# Patient Record
Sex: MALE | Race: WHITE | NOT HISPANIC OR LATINO | Employment: OTHER | URBAN - NONMETROPOLITAN AREA
[De-identification: names, ages, dates, MRNs, and addresses within clinical notes are randomized per-mention and may not be internally consistent; named-entity substitution may affect disease eponyms.]

---

## 2017-07-24 RX ORDER — DILTIAZEM HYDROCHLORIDE 240 MG/1
CAPSULE, EXTENDED RELEASE ORAL
Qty: 30 CAPSULE | Refills: 0 | OUTPATIENT
Start: 2017-07-24

## 2019-04-19 RX ORDER — ALBUTEROL SULFATE 90 UG/1
2 AEROSOL, METERED RESPIRATORY (INHALATION) EVERY 4 HOURS PRN
COMMUNITY
End: 2019-05-01

## 2019-04-21 PROBLEM — G47.33 OBSTRUCTIVE SLEEP APNEA: Status: ACTIVE | Noted: 2019-04-21

## 2019-04-21 PROBLEM — J43.8 OTHER EMPHYSEMA (HCC): Status: ACTIVE | Noted: 2019-04-21

## 2019-04-21 PROBLEM — J44.9 COPD, MODERATE: Status: ACTIVE | Noted: 2019-04-21

## 2019-04-21 PROBLEM — J45.40 MODERATE PERSISTENT ASTHMA WITHOUT COMPLICATION: Status: ACTIVE | Noted: 2019-04-21

## 2019-05-01 ENCOUNTER — OFFICE VISIT (OUTPATIENT)
Dept: PULMONOLOGY | Facility: CLINIC | Age: 64
End: 2019-05-01

## 2019-05-01 VITALS
HEIGHT: 66 IN | BODY MASS INDEX: 33.75 KG/M2 | SYSTOLIC BLOOD PRESSURE: 120 MMHG | DIASTOLIC BLOOD PRESSURE: 60 MMHG | OXYGEN SATURATION: 98 % | WEIGHT: 210 LBS | HEART RATE: 80 BPM

## 2019-05-01 DIAGNOSIS — G47.33 OBSTRUCTIVE SLEEP APNEA: ICD-10-CM

## 2019-05-01 DIAGNOSIS — J43.8 OTHER EMPHYSEMA (HCC): ICD-10-CM

## 2019-05-01 DIAGNOSIS — J45.40 MODERATE PERSISTENT ASTHMA WITHOUT COMPLICATION: ICD-10-CM

## 2019-05-01 DIAGNOSIS — J44.9 COPD, MODERATE (HCC): Primary | ICD-10-CM

## 2019-05-01 DIAGNOSIS — M41.80 DEXTROSCOLIOSIS: ICD-10-CM

## 2019-05-01 PROCEDURE — 99214 OFFICE O/P EST MOD 30 MIN: CPT | Performed by: INTERNAL MEDICINE

## 2019-05-01 RX ORDER — BUDESONIDE AND FORMOTEROL FUMARATE DIHYDRATE 160; 4.5 UG/1; UG/1
2 AEROSOL RESPIRATORY (INHALATION) 2 TIMES DAILY
Qty: 1 INHALER | Refills: 11 | Status: SHIPPED | OUTPATIENT
Start: 2019-05-01 | End: 2019-05-31

## 2019-05-01 RX ORDER — RANITIDINE HCL 75 MG
75 TABLET ORAL 2 TIMES DAILY
COMMUNITY
End: 2021-06-29 | Stop reason: ALTCHOICE

## 2019-05-01 RX ORDER — DILTIAZEM HYDROCHLORIDE 240 MG/1
CAPSULE, COATED, EXTENDED RELEASE ORAL
COMMUNITY
Start: 2019-04-01

## 2019-05-01 RX ORDER — DILTIAZEM HYDROCHLORIDE 240 MG/1
CAPSULE, COATED, EXTENDED RELEASE ORAL
COMMUNITY
End: 2019-05-01

## 2019-05-01 RX ORDER — DIPHENHYDRAMINE HCL 25 MG
50 CAPSULE ORAL 2 TIMES DAILY
COMMUNITY

## 2019-05-01 RX ORDER — PRAVASTATIN SODIUM 40 MG
40 TABLET ORAL DAILY
COMMUNITY
Start: 2019-04-01

## 2019-05-01 RX ORDER — ALBUTEROL SULFATE 90 UG/1
2 AEROSOL, METERED RESPIRATORY (INHALATION) EVERY 4 HOURS PRN
Qty: 1 INHALER | Refills: 11 | Status: SHIPPED | OUTPATIENT
Start: 2019-05-01 | End: 2019-05-31

## 2019-05-01 RX ORDER — ACETAMINOPHEN 500 MG
500 TABLET ORAL
COMMUNITY

## 2019-05-01 RX ORDER — NAPROXEN 500 MG/1
500 TABLET ORAL 2 TIMES DAILY WITH MEALS
COMMUNITY
Start: 2019-04-01

## 2019-05-01 NOTE — PROGRESS NOTES
Subjective   Feliberto Eli is a 64 y.o. male.     Background: Pt with dyspnea, FEV1 56% pred 2018, gold B copd, asthma overlap, emphysema, olivier    Chief Complaint   Patient presents with   • Shortness of Breath        History of Present Illness   He tried the inhalers which didn't help, but then a couple of months went past.  He had cough due to the dry powder.  He has tried symbicort, bevespi, trelegy, dulera, asmanex.  The only one helpful is Symbicort.  Emergency inhaler has been helpful and he has not had a full blown asthma attack in over 2 years.  He has severe scoliosis and has been short of breath his entire life related to that.  Dyspnea is triggerd by hairspray, cleaning solutions, and pollen and perfumes.    Medical/Family/Social History   has a past medical history of Arthritis, Cancer (CMS/HCC), COPD (chronic obstructive pulmonary disease) (CMS/HCC), Dextroscoliosis (5/1/2019), Diverticulosis, Elevated cholesterol, Hemorrhoids, and Hypertension.   has a past surgical history that includes Colonoscopy; Prostate surgery; and Back surgery.  family history includes Cancer in his other; Diabetes in his father; Heart disease in his father, mother, and other; Hyperlipidemia in his father, mother, and other; Hypertension in his father, mother, and other.   reports that he has never smoked. He has never used smokeless tobacco. Alcohol use questions deferred to the physician. Drug use questions deferred to the physician.  No Known Allergies  Medications    Current Outpatient Medications:   •  acetaminophen (TYLENOL) 100 MG/ML solution, Take 1,000 mg/kg by mouth Every 4 (Four) Hours As Needed for Fever., Disp: , Rfl:   •  diltiaZEM CD (CARDIZEM CD) 240 MG 24 hr capsule, , Disp: , Rfl:   •  diphenhydrAMINE (BENADRYL ALLERGY) 25 mg capsule, Benadryl Allergy, Disp: , Rfl:   •  naproxen (NAPROSYN) 500 MG tablet, , Disp: , Rfl:   •  pravastatin (PRAVACHOL) 40 MG tablet, , Disp: , Rfl:   •  raNITIdine (ZANTAC) 75 MG  "tablet, Take 75 mg by mouth 2 (Two) Times a Day., Disp: , Rfl:   •  albuterol sulfate  (90 Base) MCG/ACT inhaler, Inhale 2 puffs Every 4 (Four) Hours As Needed for Wheezing or Shortness of Air for up to 30 days. Dispense formulary preferred brand, Disp: 1 inhaler, Rfl: 11  •  budesonide-formoterol (SYMBICORT) 160-4.5 MCG/ACT inhaler, Inhale 2 puffs 2 (Two) Times a Day for 30 days., Disp: 1 inhaler, Rfl: 11    Review of Systems   Constitutional: Negative for chills and fever.   Cardiovascular: Negative for chest pain.   Gastrointestinal: Negative for nausea and vomiting.     Objective   /60   Pulse 80   Ht 167.6 cm (66\")   Wt 95.3 kg (210 lb)   SpO2 98% Comment: RA  BMI 33.89 kg/m²   Physical Exam   Constitutional: He appears well-developed and well-nourished. He does not appear ill. No distress.   HENT:   Head: Normocephalic and atraumatic.   Nose: Nose normal.   Eyes: Conjunctivae and EOM are normal.   Neck: Neck supple.   Cardiovascular: Normal rate, regular rhythm, S1 normal and S2 normal.   Pulmonary/Chest: Effort normal. He has no wheezes. He has no rales.   Abdominal: Soft. He exhibits no distension. There is no tenderness. There is no guarding.   Musculoskeletal: He exhibits deformity (severe scoliosis).   Neurological: He is alert.   Skin: Skin is warm and dry. No rash noted.   Psychiatric: He has a normal mood and affect.     Assessment/Plan   Problem List Items Addressed This Visit        Respiratory    Moderate persistent asthma without complication    Relevant Medications    albuterol sulfate  (90 Base) MCG/ACT inhaler    budesonide-formoterol (SYMBICORT) 160-4.5 MCG/ACT inhaler    Other emphysema (CMS/HCC)    Relevant Medications    diphenhydrAMINE (BENADRYL ALLERGY) 25 mg capsule    albuterol sulfate  (90 Base) MCG/ACT inhaler    budesonide-formoterol (SYMBICORT) 160-4.5 MCG/ACT inhaler    Obstructive sleep apnea    COPD, moderate (CMS/HCC) - Primary    Relevant " Medications    diphenhydrAMINE (BENADRYL ALLERGY) 25 mg capsule    albuterol sulfate  (90 Base) MCG/ACT inhaler    budesonide-formoterol (SYMBICORT) 160-4.5 MCG/ACT inhaler       Musculoskeletal and Integument    Dextroscoliosis        Patient's Body mass index is 33.89 kg/m². BMI is above normal parameters. Recommendations include: referral to primary care.      After discussion, we will change inhaler therapy to symbicort and albuterol rescue.  Hopefully continuous exposure to the ics will help keep him under better control.  These but none of the others have been helpful.  Discussed the coincidence of emphysema and asthma. Try to avoid triggers.  Annual flu vaccine.  We discussed pulmonary rehab.  He would like to do that once other circumstances in his life are settled and he gets time to come in to do it.  We discussed the mechanical problems related to scoliosis are significant triggers.       Electronically signed by Aman Joseph MD, 5/1/2019, 11:35 AM

## 2019-11-04 ENCOUNTER — OFFICE VISIT (OUTPATIENT)
Dept: PULMONOLOGY | Facility: CLINIC | Age: 64
End: 2019-11-04

## 2019-11-04 VITALS
SYSTOLIC BLOOD PRESSURE: 132 MMHG | HEART RATE: 76 BPM | BODY MASS INDEX: 32.14 KG/M2 | WEIGHT: 200 LBS | OXYGEN SATURATION: 99 % | DIASTOLIC BLOOD PRESSURE: 80 MMHG | HEIGHT: 66 IN

## 2019-11-04 DIAGNOSIS — M41.80 DEXTROSCOLIOSIS: ICD-10-CM

## 2019-11-04 DIAGNOSIS — J45.40 MODERATE PERSISTENT ASTHMA WITHOUT COMPLICATION: Primary | ICD-10-CM

## 2019-11-04 DIAGNOSIS — J44.9 COPD, MODERATE (HCC): ICD-10-CM

## 2019-11-04 DIAGNOSIS — Z23 NEED FOR IMMUNIZATION AGAINST INFLUENZA: ICD-10-CM

## 2019-11-04 DIAGNOSIS — G47.33 OBSTRUCTIVE SLEEP APNEA: ICD-10-CM

## 2019-11-04 PROCEDURE — 90674 CCIIV4 VAC NO PRSV 0.5 ML IM: CPT | Performed by: INTERNAL MEDICINE

## 2019-11-04 PROCEDURE — 90471 IMMUNIZATION ADMIN: CPT | Performed by: INTERNAL MEDICINE

## 2019-11-04 PROCEDURE — 94010 BREATHING CAPACITY TEST: CPT | Performed by: INTERNAL MEDICINE

## 2019-11-04 PROCEDURE — 99213 OFFICE O/P EST LOW 20 MIN: CPT | Performed by: INTERNAL MEDICINE

## 2019-11-04 NOTE — PROGRESS NOTES
Subjective   Feliberto Eli is a 64 y.o. male.     Background: Pt with dyspnea, FEV1 56% pred 2018, gold B copd, asthma overlap, emphysema, olivier    Chief Complaint   Patient presents with   • COPD        History of Present Illness   With a smile on his face he says he is up and about.  He says the spiriva was not helpful at all.  He uses the albuterol sometimes.  He developed muscle pain leading to dx of [place] spotted fever, treated with antibiotics, and he was found to have hgb a1c high and he has cut out refined sugars.  He still loses his breath and recovers quicker than he did recently.  He has lost about 10 pounds.  He went hunting Saturday, was out of breath walking up the hill but was back to normal by the time he got back to the truck.    Medical/Family/Social History   has a past medical history of Arthritis, Cancer (CMS/[facility]), COPD (chronic obstructive pulmonary disease) (CMS/[facility]), Dextroscoliosis (5/1/2019), Diverticulosis, Elevated cholesterol, Hemorrhoids, and Hypertension.   has a past surgical history that includes Colonoscopy; Prostate surgery; and Back surgery.  family history includes Cancer in an other family member; Diabetes in his father; Heart disease in his father, mother, and another family member; Hyperlipidemia in his father, mother, and another family member; Hypertension in his father, mother, and another family member.   reports that he has never smoked. He has never used smokeless tobacco. Alcohol use questions deferred to the physician. Drug use questions deferred to the physician.  No Known Allergies  Medications    Current Outpatient Medications:   •  acetaminophen (TYLENOL) 100 MG/ML solution, Take 1,000 mg/kg by mouth Every 4 (Four) Hours As Needed for Fever., Disp: , Rfl:   •  diltiaZEM CD (CARDIZEM CD) 240 MG 24 hr capsule, , Disp: , Rfl:   •  diphenhydrAMINE (BENADRYL ALLERGY) 25 mg capsule, Benadryl Allergy, Disp: , Rfl:   •  naproxen (NAPROSYN) 500 MG tablet, , Disp: , Rfl:  "  •  pravastatin (PRAVACHOL) 40 MG tablet, , Disp: , Rfl:   •  raNITIdine (ZANTAC) 75 MG tablet, Take 75 mg by mouth 2 (Two) Times a Day., Disp: , Rfl:     Review of Systems   Constitutional: Negative for chills and fever.   Respiratory: Negative for choking.    Cardiovascular: Negative for chest pain.   Gastrointestinal: Negative for vomiting.     Objective   /80   Pulse 76   Ht 167.6 cm (66\")   Wt 90.7 kg (200 lb)   SpO2 99% Comment: RA  BMI 32.28 kg/m²   Physical Exam   Constitutional: He appears well-developed and well-nourished. He does not appear ill. No distress.   HENT:   Head: Normocephalic and atraumatic.   Nose: Nose normal.   Eyes: Conjunctivae and EOM are normal.   Neck: Neck supple.   Cardiovascular: Normal rate, regular rhythm, S1 normal and S2 normal.   Pulmonary/Chest: Effort normal. He has no wheezes. He has no rales.   Abdominal: Soft. He exhibits no distension. There is no tenderness. There is no guarding.   Musculoskeletal: He exhibits no deformity.   Gait normal   Neurological: He is alert.   Skin: Skin is warm and dry. No rash noted.   Psychiatric: He has a normal mood and affect.        -----------------------------------------------------------------------------------------------       -----------------------------------------------------------------------------------------------    Pulmonary Function Test  Performed by: Aman Joseph MD  Authorized by: Aman Joseph MD      Pre Drug    FVC: 58%   FEV1: 49%   FEF 25-75%: 26%   FEV1/FVC: 67.44%    My interpretation of PFT: severe airflow obstruction, slight decline compared with prior study    -----------------------------------------------------------------------------------------------  Assessment/Plan   Problem List Items Addressed This Visit        Respiratory    Moderate persistent asthma without complication - Primary    Relevant Orders    Pulmonary Function Test (Completed)    Obstructive sleep apnea    COPD, " moderate (CMS/HCC)       Musculoskeletal and Integument    Dextroscoliosis      Other Visit Diagnoses     Need for immunization against influenza        Relevant Orders    Flucelvax Quad=>4Years (PFS) (Completed)        Patient's Body mass index is 32.28 kg/m². BMI is above normal parameters. Recommendations include: referral to primary care.      He appears to be as compensated as possible for now.  Continue inhalers as is.  PFT a little lower likely related to recent congestion.  Will continue to observe, especially with no improvement with other inhalers.       Electronically signed by Aman Joseph MD, 11/4/2019, 10:42 AM

## 2019-11-04 NOTE — PROCEDURES
Pulmonary Function Test  Performed by: Aman Joseph MD  Authorized by: Aman Joseph MD      Pre Drug    FVC: 58%   FEV1: 49%   FEF 25-75%: 26%   FEV1/FVC: 67.44%

## 2020-06-16 NOTE — PROGRESS NOTES
" GILSON Hernández  [facility]   [facility]  [address] [place] [address]  Phone: [phone]  Fax: [phone]     Feliberto Eli is a 65 y.o. male.   CC:   Chief Complaint   Patient presents with   • Asthma        HPI: Mr. Eli is being evaluated today for management of of his asthma/COPD overlap.  He experiences mild intermittent shortness of breath which is worsened by exertion with incline and improved with rest.  He has tried Spiriva which was not helpful.  He has an albuterol rescue although he seldom utilizes it.  He reports he uses it approximately once every 8 to 10 weeks.  It does work well when he does have to utilize it.  His triggers are smoke, perfume and \"oil air fresheners\". His condition is complicated by obesity, obstructive sleep apnea and scoliosis.  He has been prescribed a CPAP for his sleep apnea.  He is compliant with this and does benefit from it.  His only other health concern is a recent diagnosis of [place] spotted fever.  This was identified after a tick bite that would not heal.  He was accompanied by fatigue and myalgias.  They did not give him any medications.  They did also identify his A1c to be 6.5.  They are monitoring this closely.    The following portions of the patient's history were reviewed and updated as appropriate: allergies, current medications, past family history, past medical history, past social history, past surgical history and problem list.    Past Medical History:   Diagnosis Date   • Arthritis    • Cancer (CMS/[facility])    • COPD (chronic obstructive pulmonary disease) (CMS/[facility])    • Dextroscoliosis 5/1/2019   • Diverticulosis    • Elevated cholesterol    • Hemorrhoids    • Hypertension          Prior to Admission medications    Medication Sig Start Date End Date Taking? Authorizing Provider   acetaminophen (TYLENOL) 100 MG/ML solution Take 1,000 mg/kg by mouth Every 4 (Four) Hours As Needed for Fever.    Provider, " MD Lamonte   diltiaZEM CD (CARDIZEM CD) 240 MG 24 hr capsule  4/1/19   Lamonte Fox MD   diphenhydrAMINE (BENADRYL ALLERGY) 25 mg capsule Benadryl Allergy    Lamonte Fox MD   naproxen (NAPROSYN) 500 MG tablet  4/1/19   Lamonte Fox MD   pravastatin (PRAVACHOL) 40 MG tablet  4/1/19   Lamonte Fox MD   raNITIdine (ZANTAC) 75 MG tablet Take 75 mg by mouth 2 (Two) Times a Day.    Lamonte Fox MD       Family History   Problem Relation Age of Onset   • Heart disease Mother    • Hypertension Mother    • Hyperlipidemia Mother    • Heart disease Father    • Hypertension Father    • Hyperlipidemia Father    • Diabetes Father    • Hyperlipidemia Other    • Cancer Other    • Heart disease Other    • Hypertension Other        Social History     Socioeconomic History   • Marital status:      Spouse name: Not on file   • Number of children: Not on file   • Years of education: Not on file   • Highest education level: Not on file   Tobacco Use   • Smoking status: Never Smoker   • Smokeless tobacco: Never Used   Substance and Sexual Activity   • Alcohol use: Defer   • Drug use: Defer   • Sexual activity: Defer       Review of Systems   Constitutional: Positive for fatigue. Negative for activity change, chills and fever.   HENT: Negative for congestion, postnasal drip, rhinorrhea, sinus pressure, sore throat and trouble swallowing.    Eyes: Negative for blurred vision, double vision and pain.   Respiratory: Positive for cough and shortness of breath. Negative for chest tightness and wheezing.    Cardiovascular: Negative for chest pain, palpitations and leg swelling.   Gastrointestinal: Negative for abdominal distention, constipation, diarrhea, nausea and vomiting.   Endocrine: Negative for polydipsia, polyphagia and polyuria.   Genitourinary: Negative for dysuria, frequency and urgency.   Musculoskeletal: Positive for myalgias. Negative for arthralgias, back pain, gait problem  and joint swelling.   Skin: Positive for rash. Negative for color change, dry skin and skin lesions.   Allergic/Immunologic: Negative for environmental allergies, food allergies and immunocompromised state.   Neurological: Negative for dizziness, seizures, speech difficulty, weakness, light-headedness, memory problem and confusion.   Hematological: Negative for adenopathy. Does not bruise/bleed easily.   Psychiatric/Behavioral: Positive for sleep disturbance. Negative for negative for hyperactivity and depressed mood. The patient is not nervous/anxious.        /70   Pulse 91   Temp 98.1 °F (36.7 °C)   Wt 85.5 kg (188 lb 9.6 oz)   SpO2 98% Comment: RA  BMI 30.44 kg/m²     Physical Exam   Constitutional: He is oriented to person, place, and time. He appears well-developed and well-nourished. No distress. He is obese.  HENT:   Head: Normocephalic and atraumatic.   Right Ear: External ear normal.   Left Ear: External ear normal.   Nose: Nose normal.   Mouth/Throat: Oropharynx is clear and moist. No oropharyngeal exudate.   MASK ON, GLASSES   Eyes: Pupils are equal, round, and reactive to light. Conjunctivae and EOM are normal. Right eye exhibits no discharge. Left eye exhibits no discharge.   Neck: Normal range of motion. Neck supple. No JVD present.   Cardiovascular: Normal rate and regular rhythm.   No murmur heard.  Pulmonary/Chest: Effort normal and breath sounds normal. No respiratory distress. He has no wheezes.   Abdominal: Soft. Bowel sounds are normal. He exhibits no distension. There is no tenderness.   Musculoskeletal: Normal range of motion. He exhibits no edema or deformity.   SEVERE SCOLIOSIS   Neurological: He is alert and oriented to person, place, and time. He displays normal reflexes. No cranial nerve deficit. Coordination normal.   Skin: Skin is warm and dry. No rash noted. He is not diaphoretic. No erythema.   Psychiatric: He has a normal mood and affect. His behavior is normal. Thought  content normal.   Nursing note and vitals reviewed.      Pulmonary Functions Testing Results:    PFT Values        Some values may be hidden. Unless noted otherwise, only the newest values recorded on each date are displayed.         Old Values PFT Results 11/4/19   No data to display.      Pre Drug PFT Results 11/4/19   FVC 58   FEV1 49   FEF 25-75% 26   FEV1/FVC 67.44      Post Drug PFT Results 11/4/19   No data to display.      Other Tests PFT Results 11/4/19   No data to display.           Results for orders placed in visit on 11/04/19   Pulmonary Function Test    Narrative Naomy Gleason, RRT     11/4/2019 10:36 AM  Pulmonary Function Test  Performed by: Aman Joseph MD  Authorized by: Aman Joseph MD      Pre Drug    FVC: 58%   FEV1: 49%   FEF 25-75%: 26%   FEV1/FVC: 67.44%         No PFTs for this visit    CXR: No imaging for this visit        Problem List Items Addressed This Visit        Respiratory    Moderate persistent asthma without complication - Primary    Other emphysema (CMS/HCC)    COPD, moderate (CMS/HCC)       Musculoskeletal and Integument    Dextroscoliosis        Patient's Body mass index is 30.44 kg/m². BMI is above normal parameters. Recommendations include: educational material.      Assessment/Plan         Moderate asthma and COPD, unchanged since his last visit with Dr. Joseph.  He has not benefited from long-acting inhalers but responds well to albuterol as needed.  He is using it very seldom.  Suspect some of his breathing is related to his severe scoliosis.  He is utilizing an incentive spirometer multiple times a day and able to pull 2200.  I encouraged him to continue to do this to help condition his lungs.  Unable to do PFTs today due to COVID.  We will update those when he returns in 6 months to see Dr. Joseph.  He is compliant with his CPAP and benefiting from that therefore I encouraged him to continue it.  He will call sooner if needed.    Gabriella Thomas,  APRN  6/25/2020  15:10    Return in about 6 months (around 12/25/2020) for FVL, MAKE APPT WITH DR XAVIER.

## 2020-06-25 ENCOUNTER — OFFICE VISIT (OUTPATIENT)
Dept: PULMONOLOGY | Facility: CLINIC | Age: 65
End: 2020-06-25

## 2020-06-25 VITALS
WEIGHT: 188.6 LBS | OXYGEN SATURATION: 98 % | TEMPERATURE: 98.1 F | DIASTOLIC BLOOD PRESSURE: 70 MMHG | BODY MASS INDEX: 30.44 KG/M2 | SYSTOLIC BLOOD PRESSURE: 126 MMHG | HEART RATE: 91 BPM

## 2020-06-25 DIAGNOSIS — M41.80 DEXTROSCOLIOSIS: ICD-10-CM

## 2020-06-25 DIAGNOSIS — J45.40 MODERATE PERSISTENT ASTHMA WITHOUT COMPLICATION: Primary | ICD-10-CM

## 2020-06-25 DIAGNOSIS — J43.8 OTHER EMPHYSEMA (HCC): ICD-10-CM

## 2020-06-25 DIAGNOSIS — J44.9 COPD, MODERATE (HCC): ICD-10-CM

## 2020-06-25 PROCEDURE — 99214 OFFICE O/P EST MOD 30 MIN: CPT | Performed by: NURSE PRACTITIONER

## 2020-06-25 RX ORDER — HYDROCORTISONE 25 MG/G
CREAM TOPICAL
COMMUNITY
Start: 2020-06-10

## 2020-12-29 ENCOUNTER — OFFICE VISIT (OUTPATIENT)
Dept: PULMONOLOGY | Facility: CLINIC | Age: 65
End: 2020-12-29

## 2020-12-29 VITALS
TEMPERATURE: 97.1 F | OXYGEN SATURATION: 98 % | BODY MASS INDEX: 32.17 KG/M2 | HEIGHT: 64 IN | WEIGHT: 188.4 LBS | SYSTOLIC BLOOD PRESSURE: 150 MMHG | HEART RATE: 78 BPM | DIASTOLIC BLOOD PRESSURE: 80 MMHG

## 2020-12-29 DIAGNOSIS — M41.80 DEXTROSCOLIOSIS: ICD-10-CM

## 2020-12-29 DIAGNOSIS — G47.33 OBSTRUCTIVE SLEEP APNEA: ICD-10-CM

## 2020-12-29 DIAGNOSIS — J44.9 COPD, MODERATE (HCC): ICD-10-CM

## 2020-12-29 DIAGNOSIS — J45.40 MODERATE PERSISTENT ASTHMA WITHOUT COMPLICATION: Primary | ICD-10-CM

## 2020-12-29 DIAGNOSIS — J43.8 OTHER EMPHYSEMA (HCC): ICD-10-CM

## 2020-12-29 PROCEDURE — 99213 OFFICE O/P EST LOW 20 MIN: CPT | Performed by: INTERNAL MEDICINE

## 2020-12-29 NOTE — PROGRESS NOTES
Subjective   Feliberto Eli is a 65 y.o. male.     Background: Pt with dyspnea, FEV1 56% pred 2018, gold B copd, asthma overlap, emphysema, olivier    Chief Complaint   Patient presents with   • Asthma   • COPD        History of Present Illness   He had to quarantine for 4 weeks after his daughter who has pectus excavatum had covid.  He had a negative covid test.  He was congested for about 8 days. He has not had major exacerbations.  He got a new cpap machine in august after being on the old one for 5 years.  He has an incentive spirometer.  He works on a farm.    Medical/Family/Social History   has a past medical history of Arthritis, Cancer (CMS/HCC), COPD (chronic obstructive pulmonary disease) (CMS/HCC), Dextroscoliosis (5/1/2019), Diverticulosis, Elevated cholesterol, Hemorrhoids, and Hypertension.   has a past surgical history that includes Colonoscopy; Prostate surgery; and Back surgery.  family history includes Cancer in an other family member; Diabetes in his father; Heart disease in his father, mother, and another family member; Hyperlipidemia in his father, mother, and another family member; Hypertension in his father, mother, and another family member.   reports that he has never smoked. He has never used smokeless tobacco. Alcohol use questions deferred to the physician. Drug use questions deferred to the physician.  No Known Allergies  Medications    Current Outpatient Medications:   •  acetaminophen (TYLENOL) 100 MG/ML solution, Take 1,000 mg/kg by mouth Every 4 (Four) Hours As Needed for Fever., Disp: , Rfl:   •  diltiaZEM CD (CARDIZEM CD) 240 MG 24 hr capsule, , Disp: , Rfl:   •  diphenhydrAMINE (BENADRYL ALLERGY) 25 mg capsule, Take 50 mg by mouth 2 (two) times a day., Disp: , Rfl:   •  Hydrocortisone, Perianal, (ANUSOL-HC) 2.5 % rectal cream, , Disp: , Rfl:   •  naproxen (NAPROSYN) 500 MG tablet, Take 500 mg by mouth 2 (Two) Times a Day With Meals., Disp: , Rfl:   •  pravastatin (PRAVACHOL) 40 MG  "tablet, Take 40 mg by mouth Daily., Disp: , Rfl:   •  raNITIdine (ZANTAC) 75 MG tablet, Take 75 mg by mouth 2 (Two) Times a Day., Disp: , Rfl:     Review of Systems   Constitutional: Negative for chills and fever.   Cardiovascular: Negative for chest pain.   Gastrointestinal: Negative for nausea and vomiting.     Objective   /80   Pulse 78   Temp 97.1 °F (36.2 °C)   Ht 162.6 cm (64\")   Wt 85.5 kg (188 lb 6.4 oz)   SpO2 98% Comment: ra  BMI 32.34 kg/m²   Physical Exam  Constitutional:       General: He is not in acute distress.     Appearance: He is well-developed. He is not ill-appearing.   HENT:      Head: Normocephalic and atraumatic.      Nose: Nose normal.   Eyes:      Conjunctiva/sclera: Conjunctivae normal.   Neck:      Musculoskeletal: Neck supple.   Cardiovascular:      Rate and Rhythm: Normal rate and regular rhythm.      Heart sounds: S1 normal and S2 normal.   Pulmonary:      Effort: Pulmonary effort is normal. No respiratory distress.      Breath sounds: No wheezing, rhonchi or rales.   Abdominal:      General: There is no distension.      Palpations: Abdomen is soft.      Tenderness: There is no abdominal tenderness. There is no guarding.   Musculoskeletal:         General: No deformity.   Lymphadenopathy:      Cervical: No cervical adenopathy.   Skin:     General: Skin is warm and dry.      Findings: No rash.   Neurological:      Mental Status: He is alert.       PFT Values        Some values may be hidden. Unless noted otherwise, only the newest values recorded on each date are displayed.         Old Values PFT Results 11/4/19   No data to display.      Pre Drug PFT Results 11/4/19   FVC 58   FEV1 49   FEF 25-75% 26   FEV1/FVC 67.44      Post Drug PFT Results 11/4/19   No data to display.      Other Tests PFT Results 11/4/19   No data to display.                -----------------------------------------------------------------------------------------------  Assessment/Plan   Problem List " Items Addressed This Visit        Pulmonary Problems    Moderate persistent asthma without complication - Primary    Other emphysema (CMS/HCC)    Obstructive sleep apnea    COPD, moderate (CMS/HCC)       Other    Dextroscoliosis        Patient's Body mass index is 32.34 kg/m². BMI is above normal parameters. Recommendations include: referral to primary care.      He seems stable from obstructive lung disease standpoint  He is not using inhalers except albuterol prn.  Others have not been helpful  He can continue using incentive spirometry          Electronically signed by Aman Joseph MD, 12/29/2020, 15:45 CST

## 2021-06-25 NOTE — PROGRESS NOTES
"Background:  Pt with dyspnea, FEV1 56% pred 2018, gold B copd, asthma overlap, emphysema, olivier   Chief Complaint  Moderate persistent asthma without complication    Subjective    History of Present Illness       Feliberto Eli presents to [facility].  Heat and humidity really bother him.  Other inhalers were not helpful in the past.  His dad was a heavy smoker and pt had second hand smoke.  No exacerbations.  He uses the inhaler about once every 3 months and he is careful with what he is around.  Smoke and perfumes are bad.  He has had severe scoliosis problems     Objective     Vital Signs:   /82   Pulse 83   Ht 162.6 cm (64\")   Wt 87.5 kg (193 lb)   SpO2 96% Comment: RA  BMI 33.13 kg/m²   Physical Exam  Constitutional:       Appearance: Normal appearance. He is not ill-appearing or diaphoretic.   Eyes:      Extraocular Movements: Extraocular movements intact.   Pulmonary:      Effort: Pulmonary effort is normal. No respiratory distress.      Breath sounds: No wheezing, rhonchi or rales.   Musculoskeletal:      Comments: scoliosis   Skin:     Findings: No erythema or rash.   Neurological:      Mental Status: He is alert.        Result Review  Data Reviewed:{ Labs  Result Review  Imaging  Media :23}       PFT Values        Some values may be hidden. Unless noted otherwise, only the newest values recorded on each date are displayed.         Old Values PFT Results 11/4/19   No data to display.      Pre Drug PFT Results 11/4/19   FVC 58   FEV1 49   FEF 25-75% 26   FEV1/FVC 67.44      Post Drug PFT Results 11/4/19   No data to display.      Other Tests PFT Results 11/4/19   No data to display.                      Assessment and Plan  {CC Problem List  Visit Diagnosis  ROS  Review (Popup)  [facility] Maintenance  Quality  BestPractice  Medications  SmartSets  SnapShot Encounters  Media :23}   Problem List Items Addressed This Visit        Pulmonary " Problems    Moderate persistent asthma without complication - Primary    Relevant Orders    XR Chest 2 View    Obstructive sleep apnea    COPD, moderate (CMS/HCC)    Relevant Orders    XR Chest 2 View      he is overall stable with respect to asthma/copd  Continue using his incentive spirometer and albuterol prn  If he has progression I am concerned we would need to add inhaled steroid despite absence of perceived benefit in past.  Will plan follow up spirometry and discuss    Follow Up {Instructions Charge Capture  Follow-up Communications :23}   Return in about 6 months (around 12/29/2021).  Patient was given instructions and counseling regarding his condition or for health maintenance advice. Please see specific information pulled into the AVS if appropriate.    Electronically signed by Aman Joseph MD, 6/29/2021, 15:04 CDT

## 2021-06-29 ENCOUNTER — OFFICE VISIT (OUTPATIENT)
Dept: PULMONOLOGY | Facility: CLINIC | Age: 66
End: 2021-06-29

## 2021-06-29 VITALS
DIASTOLIC BLOOD PRESSURE: 82 MMHG | BODY MASS INDEX: 32.95 KG/M2 | WEIGHT: 193 LBS | HEIGHT: 64 IN | HEART RATE: 83 BPM | SYSTOLIC BLOOD PRESSURE: 156 MMHG | OXYGEN SATURATION: 96 %

## 2021-06-29 DIAGNOSIS — J44.9 COPD, MODERATE (HCC): ICD-10-CM

## 2021-06-29 DIAGNOSIS — G47.33 OBSTRUCTIVE SLEEP APNEA: ICD-10-CM

## 2021-06-29 DIAGNOSIS — J45.40 MODERATE PERSISTENT ASTHMA WITHOUT COMPLICATION: Primary | ICD-10-CM

## 2021-06-29 PROCEDURE — 99213 OFFICE O/P EST LOW 20 MIN: CPT | Performed by: INTERNAL MEDICINE

## 2021-06-29 RX ORDER — LISINOPRIL 5 MG/1
TABLET ORAL
COMMUNITY
Start: 2021-05-27

## 2021-07-14 ENCOUNTER — TRANSCRIBE ORDERS (OUTPATIENT)
Dept: LAB | Facility: HOSPITAL | Age: 66
End: 2021-07-14

## 2021-07-14 DIAGNOSIS — Z01.818 PREOPERATIVE TESTING: Primary | ICD-10-CM

## 2021-07-19 ENCOUNTER — HOSPITAL ENCOUNTER (OUTPATIENT)
Dept: GENERAL RADIOLOGY | Facility: HOSPITAL | Age: 66
Discharge: HOME | End: 2021-07-19

## 2021-07-19 ENCOUNTER — LAB (OUTPATIENT)
Dept: LAB | Facility: HOSPITAL | Age: 66
End: 2021-07-19

## 2021-07-19 DIAGNOSIS — J45.40 MODERATE PERSISTENT ASTHMA WITHOUT COMPLICATION: ICD-10-CM

## 2021-07-19 DIAGNOSIS — Z01.818 PREOPERATIVE TESTING: ICD-10-CM

## 2021-07-19 DIAGNOSIS — J44.9 COPD, MODERATE (HCC): ICD-10-CM

## 2021-07-19 LAB — SARS-COV-2 ORF1AB RESP QL NAA+PROBE: NOT DETECTED

## 2021-07-19 PROCEDURE — 71046 X-RAY EXAM CHEST 2 VIEWS: CPT

## 2021-07-19 PROCEDURE — U0004 COV-19 TEST NON-CDC HGH THRU: HCPCS | Performed by: INTERNAL MEDICINE

## 2021-07-19 PROCEDURE — C9803 HOPD COVID-19 SPEC COLLECT: HCPCS | Performed by: INTERNAL MEDICINE

## 2021-07-19 NOTE — PROGRESS NOTES
Let pt know this looked ok. No pneumonia or other acute problems.  Nothing else to do for now.  Keep follow up as planned

## 2021-07-21 ENCOUNTER — OFFICE VISIT (OUTPATIENT)
Dept: PULMONOLOGY | Facility: CLINIC | Age: 66
End: 2021-07-21

## 2021-07-21 DIAGNOSIS — J44.9 COPD, MODERATE (HCC): Primary | ICD-10-CM

## 2021-07-21 PROCEDURE — 94010 BREATHING CAPACITY TEST: CPT | Performed by: INTERNAL MEDICINE

## 2021-07-21 PROCEDURE — 94729 DIFFUSING CAPACITY: CPT | Performed by: INTERNAL MEDICINE

## 2021-07-21 PROCEDURE — 94727 GAS DIL/WSHOT DETER LNG VOL: CPT | Performed by: INTERNAL MEDICINE

## 2021-07-21 NOTE — PROCEDURES
Pulmonary Function Test  Performed by: Naomy Gleason, RRT  Authorized by: Aman Joseph MD      Pre Drug % Predicted    FVC: 83%   FEV1: 67%   FEF 25-75%: 29%   FEV1/FVC: 64.17%   T%   RV: 94%   DLCO: 74%   D/VAsb: 102%    Interpretation   Spirometry   Spirometry shows moderate obstruction. There is reduced midflow suggesting small airway/airflow obstruction.   Review of FVL curve   Patient's effort is normal.   Lung Volume Measurements  Measurements show normal results.   Diffusion Capacity  The patient's diffusion capacity is normal.  Diffusion capacity is normal when corrected for alveolar volume.

## 2021-12-29 ENCOUNTER — OFFICE VISIT (OUTPATIENT)
Dept: PULMONOLOGY | Facility: CLINIC | Age: 66
End: 2021-12-29

## 2021-12-29 VITALS
SYSTOLIC BLOOD PRESSURE: 142 MMHG | BODY MASS INDEX: 32.95 KG/M2 | WEIGHT: 193 LBS | HEART RATE: 86 BPM | OXYGEN SATURATION: 95 % | DIASTOLIC BLOOD PRESSURE: 90 MMHG | HEIGHT: 64 IN

## 2021-12-29 DIAGNOSIS — J44.9 COPD, MODERATE (HCC): Primary | ICD-10-CM

## 2021-12-29 DIAGNOSIS — J43.8 OTHER EMPHYSEMA (HCC): ICD-10-CM

## 2021-12-29 DIAGNOSIS — Z23 NEED FOR INFLUENZA VACCINATION: ICD-10-CM

## 2021-12-29 DIAGNOSIS — G47.33 OBSTRUCTIVE SLEEP APNEA: ICD-10-CM

## 2021-12-29 PROCEDURE — 90662 IIV NO PRSV INCREASED AG IM: CPT | Performed by: INTERNAL MEDICINE

## 2021-12-29 PROCEDURE — 90471 IMMUNIZATION ADMIN: CPT | Performed by: INTERNAL MEDICINE

## 2021-12-29 PROCEDURE — 99213 OFFICE O/P EST LOW 20 MIN: CPT | Performed by: INTERNAL MEDICINE

## 2021-12-29 NOTE — PROGRESS NOTES
"Background:  Pt with dyspnea, FEV1 56% pred 2018, gold B copd, asthma overlap, emphysema, olivier   Chief Complaint  Asthma    Subjective    History of Present Illness       Feliberto Eli presents to [facility].  He needs to use albuterol inhaler only about 2-3 times per year when around strong odors.  He had a bronchitic illness a few weeks ago, self limited.     Objective     Vital Signs:   /90   Pulse 86   Ht 162.6 cm (64\")   Wt 87.5 kg (193 lb)   SpO2 95% Comment: RA  BMI 33.13 kg/m²   Physical Exam  Constitutional:       Appearance: Normal appearance. He is not ill-appearing or diaphoretic.   Eyes:      Extraocular Movements: Extraocular movements intact.   Pulmonary:      Effort: Pulmonary effort is normal. No respiratory distress.      Breath sounds: No wheezing, rhonchi or rales.   Skin:     Findings: No erythema or rash.   Neurological:      Mental Status: He is alert.        Result Review  Data Reviewed:{ Labs  Result Review  Imaging  Media :23}     XR Chest 2 View (07/19/2021 10:53) copd changes, otherwise negative    PFT Values        Some values may be hidden. Unless noted otherwise, only the newest values recorded on each date are displayed.         Old Values PFT Results 7/21/21   No data to display.      Pre Drug PFT Results 7/21/21   FVC 83   FEV1 67   FEF 25-75% 29   FEV1/FVC 64.17      Post Drug PFT Results 7/21/21   No data to display.      Other Tests PFT Results 7/21/21   TLC 81   RV 94   DLCO 74   D/VAsb 102                      Assessment and Plan  {CC Problem List  Visit Diagnosis  ROS  Review (Popup)  Health Maintenance  Quality  BestPractice  Medications  SmartSets  SnapShot Encounters  Media :23}   Problem List Items Addressed This Visit        Pulmonary Problems    Other emphysema (HCC)    Obstructive sleep apnea    COPD, moderate (HCC) - Primary      He is stable.  Continue albuterol as needed.    Flu shot " today    Follow Up {Instructions Charge Capture  Follow-up Communications :23}   No follow-ups on file.  Patient was given instructions and counseling regarding his condition or for health maintenance advice. Please see specific information pulled into the AVS if appropriate.    Electronically signed by Aman Joseph MD, 12/29/2021, 09:53 CST

## 2022-01-21 ENCOUNTER — TRANSCRIBE ORDERS (OUTPATIENT)
Dept: ADMINISTRATIVE | Facility: HOSPITAL | Age: 67
End: 2022-01-21

## 2022-01-21 DIAGNOSIS — R06.00 DYSPNEA, UNSPECIFIED TYPE: Primary | ICD-10-CM

## 2022-02-07 ENCOUNTER — HOSPITAL ENCOUNTER (OUTPATIENT)
Dept: CARDIOLOGY | Facility: HOSPITAL | Age: 67
Discharge: HOME | End: 2022-02-07

## 2022-02-07 VITALS
WEIGHT: 187.39 LBS | DIASTOLIC BLOOD PRESSURE: 81 MMHG | SYSTOLIC BLOOD PRESSURE: 119 MMHG | HEART RATE: 67 BPM | HEIGHT: 65 IN | BODY MASS INDEX: 31.22 KG/M2

## 2022-02-07 DIAGNOSIS — R06.00 DYSPNEA, UNSPECIFIED TYPE: ICD-10-CM

## 2022-02-07 LAB
BH CV NUCLEAR PRIOR STUDY: 3
BH CV REST NUCLEAR ISOTOPE DOSE: 10.5 MCI
BH CV STRESS BP STAGE 1: NORMAL
BH CV STRESS COMMENTS STAGE 1: NORMAL
BH CV STRESS DOSE REGADENOSON STAGE 1: 0.4
BH CV STRESS DURATION MIN STAGE 1: 0
BH CV STRESS DURATION SEC STAGE 1: 10
BH CV STRESS HR STAGE 1: 88
BH CV STRESS NUCLEAR ISOTOPE DOSE: 31.8 MCI
BH CV STRESS PROTOCOL 1: NORMAL
BH CV STRESS RECOVERY BP: NORMAL MMHG
BH CV STRESS RECOVERY HR: 83 BPM
BH CV STRESS STAGE 1: 1
LV EF NUC BP: 73 %
MAXIMAL PREDICTED HEART RATE: 154 BPM
PERCENT MAX PREDICTED HR: 57.14 %
STRESS BASELINE BP: NORMAL MMHG
STRESS BASELINE HR: 68 BPM
STRESS PERCENT HR: 67 %
STRESS POST EXERCISE DUR MIN: 0 MIN
STRESS POST EXERCISE DUR SEC: 10 SEC
STRESS POST PEAK BP: NORMAL MMHG
STRESS POST PEAK HR: 88 BPM
STRESS TARGET HR: 131 BPM

## 2022-02-07 PROCEDURE — 0 TECHNETIUM SESTAMIBI: Performed by: INTERNAL MEDICINE

## 2022-02-07 PROCEDURE — 78452 HT MUSCLE IMAGE SPECT MULT: CPT | Performed by: INTERNAL MEDICINE

## 2022-02-07 PROCEDURE — 93017 CV STRESS TEST TRACING ONLY: CPT

## 2022-02-07 PROCEDURE — 93018 CV STRESS TEST I&R ONLY: CPT | Performed by: INTERNAL MEDICINE

## 2022-02-07 PROCEDURE — A9500 TC99M SESTAMIBI: HCPCS | Performed by: INTERNAL MEDICINE

## 2022-02-07 PROCEDURE — 25010000002 REGADENOSON 0.4 MG/5ML SOLUTION: Performed by: INTERNAL MEDICINE

## 2022-02-07 PROCEDURE — 78452 HT MUSCLE IMAGE SPECT MULT: CPT

## 2022-02-07 RX ORDER — CITALOPRAM 20 MG/1
40 TABLET ORAL DAILY
COMMUNITY

## 2022-02-07 RX ADMIN — TECHNETIUM TC 99M SESTAMIBI 1 DOSE: 1 INJECTION INTRAVENOUS at 09:50

## 2022-02-07 RX ADMIN — TECHNETIUM TC 99M SESTAMIBI 1 DOSE: 1 INJECTION INTRAVENOUS at 11:45

## 2022-02-07 RX ADMIN — REGADENOSON 0.4 MG: 0.08 INJECTION, SOLUTION INTRAVENOUS at 11:03

## 2022-03-08 DIAGNOSIS — R94.39 ABNORMAL NUCLEAR STRESS TEST: Primary | ICD-10-CM

## 2022-03-08 RX ORDER — SODIUM CHLORIDE 9 MG/ML
75 INJECTION, SOLUTION INTRAVENOUS CONTINUOUS
Status: CANCELLED | OUTPATIENT
Start: 2022-03-08

## 2022-03-09 PROBLEM — R94.39 ABNORMAL NUCLEAR STRESS TEST: Status: ACTIVE | Noted: 2022-03-09

## 2022-03-28 ENCOUNTER — HOSPITAL ENCOUNTER (OUTPATIENT)
Facility: HOSPITAL | Age: 67
Setting detail: HOSPITAL OUTPATIENT SURGERY
Discharge: HOME | End: 2022-03-28
Attending: INTERNAL MEDICINE | Admitting: INTERNAL MEDICINE

## 2022-03-28 VITALS
OXYGEN SATURATION: 94 % | HEIGHT: 65 IN | BODY MASS INDEX: 31.49 KG/M2 | RESPIRATION RATE: 14 BRPM | SYSTOLIC BLOOD PRESSURE: 136 MMHG | WEIGHT: 189 LBS | HEART RATE: 67 BPM | TEMPERATURE: 97.1 F | DIASTOLIC BLOOD PRESSURE: 83 MMHG

## 2022-03-28 DIAGNOSIS — R94.39 ABNORMAL NUCLEAR STRESS TEST: ICD-10-CM

## 2022-03-28 LAB
ALBUMIN SERPL-MCNC: 4.5 G/DL (ref 3.5–5.2)
ALBUMIN/GLOB SERPL: 2.1 G/DL
ALP SERPL-CCNC: 59 U/L (ref 39–117)
ALT SERPL W P-5'-P-CCNC: 16 U/L (ref 1–41)
ANION GAP SERPL CALCULATED.3IONS-SCNC: 10 MMOL/L (ref 5–15)
AST SERPL-CCNC: 18 U/L (ref 1–40)
BASOPHILS # BLD AUTO: 0.06 10*3/MM3 (ref 0–0.2)
BASOPHILS NFR BLD AUTO: 1 % (ref 0–1.5)
BILIRUB SERPL-MCNC: 0.4 MG/DL (ref 0–1.2)
BUN SERPL-MCNC: 13 MG/DL (ref 8–23)
BUN/CREAT SERPL: 22.4 (ref 7–25)
CALCIUM SPEC-SCNC: 9.9 MG/DL (ref 8.6–10.5)
CHLORIDE SERPL-SCNC: 103 MMOL/L (ref 98–107)
CO2 SERPL-SCNC: 28 MMOL/L (ref 22–29)
CREAT SERPL-MCNC: 0.58 MG/DL (ref 0.76–1.27)
DEPRECATED RDW RBC AUTO: 46.5 FL (ref 37–54)
EGFRCR SERPLBLD CKD-EPI 2021: 107.6 ML/MIN/1.73
EOSINOPHIL # BLD AUTO: 0.21 10*3/MM3 (ref 0–0.4)
EOSINOPHIL NFR BLD AUTO: 3.5 % (ref 0.3–6.2)
ERYTHROCYTE [DISTWIDTH] IN BLOOD BY AUTOMATED COUNT: 14.4 % (ref 12.3–15.4)
GLOBULIN UR ELPH-MCNC: 2.1 GM/DL
GLUCOSE SERPL-MCNC: 97 MG/DL (ref 65–99)
HCT VFR BLD AUTO: 45 % (ref 37.5–51)
HGB BLD-MCNC: 15.2 G/DL (ref 13–17.7)
IMM GRANULOCYTES # BLD AUTO: 0.02 10*3/MM3 (ref 0–0.05)
IMM GRANULOCYTES NFR BLD AUTO: 0.3 % (ref 0–0.5)
INR PPP: 1.03 (ref 0.91–1.09)
LYMPHOCYTES # BLD AUTO: 1.15 10*3/MM3 (ref 0.7–3.1)
LYMPHOCYTES NFR BLD AUTO: 19.2 % (ref 19.6–45.3)
MCH RBC QN AUTO: 29.9 PG (ref 26.6–33)
MCHC RBC AUTO-ENTMCNC: 33.8 G/DL (ref 31.5–35.7)
MCV RBC AUTO: 88.6 FL (ref 79–97)
MONOCYTES # BLD AUTO: 0.55 10*3/MM3 (ref 0.1–0.9)
MONOCYTES NFR BLD AUTO: 9.2 % (ref 5–12)
NEUTROPHILS NFR BLD AUTO: 4.01 10*3/MM3 (ref 1.7–7)
NEUTROPHILS NFR BLD AUTO: 66.8 % (ref 42.7–76)
NRBC BLD AUTO-RTO: 0 /100 WBC (ref 0–0.2)
PLATELET # BLD AUTO: 260 10*3/MM3 (ref 140–450)
PMV BLD AUTO: 9.8 FL (ref 6–12)
POTASSIUM SERPL-SCNC: 3.9 MMOL/L (ref 3.5–5.2)
PROT SERPL-MCNC: 6.6 G/DL (ref 6–8.5)
PROTHROMBIN TIME: 13.1 SECONDS (ref 11.9–14.6)
RBC # BLD AUTO: 5.08 10*6/MM3 (ref 4.14–5.8)
SODIUM SERPL-SCNC: 141 MMOL/L (ref 136–145)
WBC NRBC COR # BLD: 6 10*3/MM3 (ref 3.4–10.8)

## 2022-03-28 PROCEDURE — 93567 NJX CAR CTH SPRVLV AORTGRPHY: CPT | Performed by: INTERNAL MEDICINE

## 2022-03-28 PROCEDURE — 25010000002 HEPARIN (PORCINE) 2000-0.9 UNIT/L-% SOLUTION: Performed by: INTERNAL MEDICINE

## 2022-03-28 PROCEDURE — 25010000002 FENTANYL CITRATE (PF) 100 MCG/2ML SOLUTION: Performed by: INTERNAL MEDICINE

## 2022-03-28 PROCEDURE — 99152 MOD SED SAME PHYS/QHP 5/>YRS: CPT | Performed by: INTERNAL MEDICINE

## 2022-03-28 PROCEDURE — 85025 COMPLETE CBC W/AUTO DIFF WBC: CPT | Performed by: INTERNAL MEDICINE

## 2022-03-28 PROCEDURE — 25010000002 MIDAZOLAM PER 1 MG: Performed by: INTERNAL MEDICINE

## 2022-03-28 PROCEDURE — C1769 GUIDE WIRE: HCPCS | Performed by: INTERNAL MEDICINE

## 2022-03-28 PROCEDURE — 93454 CORONARY ARTERY ANGIO S&I: CPT | Performed by: INTERNAL MEDICINE

## 2022-03-28 PROCEDURE — 0 IOPAMIDOL PER 1 ML: Performed by: INTERNAL MEDICINE

## 2022-03-28 PROCEDURE — 85610 PROTHROMBIN TIME: CPT | Performed by: INTERNAL MEDICINE

## 2022-03-28 PROCEDURE — 80053 COMPREHEN METABOLIC PANEL: CPT | Performed by: INTERNAL MEDICINE

## 2022-03-28 PROCEDURE — C1760 CLOSURE DEV, VASC: HCPCS | Performed by: INTERNAL MEDICINE

## 2022-03-28 PROCEDURE — C1894 INTRO/SHEATH, NON-LASER: HCPCS | Performed by: INTERNAL MEDICINE

## 2022-03-28 PROCEDURE — 25010000002 HEPARIN (PORCINE) 1000-0.9 UT/500ML-% SOLUTION: Performed by: INTERNAL MEDICINE

## 2022-03-28 PROCEDURE — S0260 H&P FOR SURGERY: HCPCS | Performed by: INTERNAL MEDICINE

## 2022-03-28 RX ORDER — FENTANYL CITRATE 50 UG/ML
INJECTION, SOLUTION INTRAMUSCULAR; INTRAVENOUS AS NEEDED
Status: DISCONTINUED | OUTPATIENT
Start: 2022-03-28 | End: 2022-03-28 | Stop reason: HOSPADM

## 2022-03-28 RX ORDER — SODIUM CHLORIDE 0.9 % (FLUSH) 0.9 %
10 SYRINGE (ML) INJECTION EVERY 12 HOURS SCHEDULED
Status: DISCONTINUED | OUTPATIENT
Start: 2022-03-28 | End: 2022-03-28 | Stop reason: HOSPADM

## 2022-03-28 RX ORDER — SODIUM CHLORIDE 0.9 % (FLUSH) 0.9 %
10 SYRINGE (ML) INJECTION AS NEEDED
Status: DISCONTINUED | OUTPATIENT
Start: 2022-03-28 | End: 2022-03-28 | Stop reason: HOSPADM

## 2022-03-28 RX ORDER — ACETAMINOPHEN 325 MG/1
650 TABLET ORAL EVERY 4 HOURS PRN
Status: DISCONTINUED | OUTPATIENT
Start: 2022-03-28 | End: 2022-03-28 | Stop reason: HOSPADM

## 2022-03-28 RX ORDER — HEPARIN SODIUM 200 [USP'U]/100ML
INJECTION, SOLUTION INTRAVENOUS AS NEEDED
Status: DISCONTINUED | OUTPATIENT
Start: 2022-03-28 | End: 2022-03-28 | Stop reason: HOSPADM

## 2022-03-28 RX ORDER — LIDOCAINE HYDROCHLORIDE 20 MG/ML
INJECTION, SOLUTION INFILTRATION; PERINEURAL AS NEEDED
Status: DISCONTINUED | OUTPATIENT
Start: 2022-03-28 | End: 2022-03-28 | Stop reason: HOSPADM

## 2022-03-28 RX ORDER — SODIUM CHLORIDE 9 MG/ML
75 INJECTION, SOLUTION INTRAVENOUS CONTINUOUS
Status: DISCONTINUED | OUTPATIENT
Start: 2022-03-28 | End: 2022-03-28 | Stop reason: HOSPADM

## 2022-03-28 RX ORDER — MIDAZOLAM HYDROCHLORIDE 1 MG/ML
INJECTION INTRAMUSCULAR; INTRAVENOUS AS NEEDED
Status: DISCONTINUED | OUTPATIENT
Start: 2022-03-28 | End: 2022-03-28 | Stop reason: HOSPADM

## 2022-03-28 RX ADMIN — SODIUM CHLORIDE 75 ML/HR: 9 INJECTION, SOLUTION INTRAVENOUS at 13:09

## 2022-03-28 NOTE — H&P
LOS: 0 days   Patient Care Team:  Aston Mendes APRN as PCP - General (Family Medicine)  Aman Joseph MD as Consulting Physician (Pulmonary Disease)  Gabreilla Thomas APRN as Nurse Practitioner (Pulmonary Disease)    Chief Complaint: Chest pain  Abnormal nuclear stress test  Persistent shortness of breath     Abad    Felibertomik Eli is a 67 y.o. male who is being seen in evaluation in the cardiovascular observation unit  Patient has been having intermittent substernal chest pain  Had nuclear stress test which showed small area of ischemia  Echo shows moderate aortic regurgitation with moderate aortic stenosis  Mild pulmonary hypertension  Shortness of breath on exertion  No presyncope  No syncope  No orthopnea  No paroxysmal nocturnal dyspnea  No sustained palpitations  Patient was referred for coronary angiography due to persistence of symptoms and not showing any improvement  Recent CT scan of chest and abdominal aorta per GILSON Thacker did not show any significant abnormality  No bleeding issues  No falls    Review of Systems   Constitutional: No chills   Has fatigue   No fever.   HENT: Negative.    Eyes: Negative.    Respiratory: Negative for cough,   No chest wall soreness,   Shortness of breath,   no wheezing, no stridor.    Cardiovascular: As above  Gastrointestinal: Negative for abdominal distention,  No abdominal pain,   No blood in stool,   No constipation,   No diarrhea,   No nausea   No vomiting.   Endocrine: Negative.    Genitourinary: Negative for difficulty urinating, dysuria, flank pain and hematuria.   Musculoskeletal: Negative.    Skin: Negative for rash and wound.   Allergic/Immunologic: Negative.    Neurological: Negative for dizziness, syncope, weakness,   No light-headedness  No  headaches.   Hematological: Does not bruise/bleed easily.   Psychiatric/Behavioral: Negative for agitation or behavioral problems,   No confusion,   the patient is  nervous/anxious.       History:    Past Medical History:   Diagnosis Date   • Arthritis    • Cancer (HCC)    • COPD (chronic obstructive pulmonary disease) (HCC)    • Dextroscoliosis 5/1/2019   • Diverticulosis    • Elevated cholesterol    • Hemorrhoids    • Hypertension      Past Surgical History:   Procedure Laterality Date   • BACK SURGERY     • COLONOSCOPY     • PROSTATE SURGERY       Social History     Socioeconomic History   • Marital status:    Tobacco Use   • Smoking status: Never Smoker   • Smokeless tobacco: Never Used   Substance and Sexual Activity   • Alcohol use: Defer   • Drug use: Defer   • Sexual activity: Defer     Family History   Problem Relation Age of Onset   • Heart disease Mother    • Hypertension Mother    • Hyperlipidemia Mother    • Heart disease Father    • Hypertension Father    • Hyperlipidemia Father    • Diabetes Father    • Hyperlipidemia Other    • Cancer Other    • Heart disease Other    • Hypertension Other        Labs:  WBC WBC   Date Value Ref Range Status   03/28/2022 6.00 3.40 - 10.80 10*3/mm3 Final      HGB Hemoglobin   Date Value Ref Range Status   03/28/2022 15.2 13.0 - 17.7 g/dL Final      HCT Hematocrit   Date Value Ref Range Status   03/28/2022 45.0 37.5 - 51.0 % Final      Platelets Platelets   Date Value Ref Range Status   03/28/2022 260 140 - 450 10*3/mm3 Final      MCV MCV   Date Value Ref Range Status   03/28/2022 88.6 79.0 - 97.0 fL Final        Results from last 7 days   Lab Units 03/28/22  1301   SODIUM mmol/L 141   POTASSIUM mmol/L 3.9   CHLORIDE mmol/L 103   CO2 mmol/L 28.0   BUN mg/dL 13   CREATININE mg/dL 0.58*   CALCIUM mg/dL 9.9   BILIRUBIN mg/dL 0.4   ALK PHOS U/L 59   ALT (SGPT) U/L 16   AST (SGOT) U/L 18   GLUCOSE mg/dL 97   No results found for: CKTOTAL, CKMB, CKMBINDEX, TROPONINI, TROPONINT  PT/INR:    Protime   Date Value Ref Range Status   03/28/2022 13.1 11.9 - 14.6 Seconds Final   /  INR   Date Value Ref Range Status   03/28/2022 1.03 0.91 - 1.09 Final       Imaging  "Results (Last 72 Hours)     ** No results found for the last 72 hours. **          Objective     No Known Allergies    Medication Review: Performed  Current Facility-Administered Medications   Medication Dose Route Frequency Provider Last Rate Last Admin   • sodium chloride 0.9 % infusion  75 mL/hr Intravenous Continuous Yandel Lauren MD 75 mL/hr at 03/28/22 1309 75 mL/hr at 03/28/22 1309       Vital Sign Min/Max for last 24 hours  Temp  Min: 97.1 °F (36.2 °C)  Max: 97.1 °F (36.2 °C)   BP  Min: 128/84  Max: 128/84   Pulse  Min: 70  Max: 70   Resp  Min: 24  Max: 24   SpO2  Min: 94 %  Max: 94 %   No data recorded   Weight  Min: 85.7 kg (189 lb)  Max: 85.7 kg (189 lb)     Flowsheet Rows    Flowsheet Row First Filed Value   Admission Height 165.1 cm (65\") Documented at 03/28/2022 1259   Admission Weight 85.7 kg (189 lb) Documented at 03/28/2022 1259              Physical Exam:    General Appearance: Awake, alert, in no acute distress  Eyes: Pupils equal and reactive    Ears: Appear intact with no abnormalities noted  Nose: Nares normal, no drainage  Neck: supple, trachea midline, no carotid bruit and no JVD  Back: no kyphosis present,    Lungs: respirations regular, respirations even and respirations unlabored  Heart: normal S1, S2, 3/6 systolic murmur left sternal border   no gallops or rubs  no rub and no click  Abdomen: normal bowel sounds, no tenderness   Skin: no bleeding, bruising or rash  Extremities: no cyanosis  Psychiatric/Behavioral: Negative for agitation, behavioral problems, confusion, the patient does  appear to be nervous/anxious.       Results Review:   I reviewed the patient's new clinical results.  I reviewed the patient's new imaging results and agree with the interpretation.  I reviewed the patient's other test results and agree with the interpretation  I personally viewed and interpreted the patient's EKG/Telemetry data    Discussed with patient  Updated patient regarding any new or relevant " abnormalities on review of records or any new findings on physical exam.   Mentioned to patient about purpose of visit and desirable health short and long term goals and objectives.     Reviewed available prior notes, consults, prior visits, laboratory findings, radiology and cardiology relevant reports.   Updated chart as applicable.   I have reviewed the patient's medical history in detail and updated the computerized patient record as relevant.          Assessment/Plan       Abnormal nuclear stress test  Chest pain  Moderate aortic regurgitation  Moderate aortic stenosis  Scoliosis      Plan      Recommend cardiac catheterization, selective coronary angiography, left ventriculography and percutaneous coronary intervention with application of arteriotomy hemostatic closure device.    I discussed cardiac catheterization, the procedure, risks (including bleeding, infection, vascular damage [including minor oozing, bruising, bleeding, and up to and including but not limited to the need for vascular surgery, emergency cardiothoracic surgery, contrast reaction, renal failure, respiratory failure, heart attack, stroke, arrhythmia and even death), benefits, and alternatives and the patient has voiced understanding and is willing to proceed.    Adequate pre-hydration and post cardiac catheterization hydration.  Premedications as required and indicated for cardiac catheterization.    No contraindication to drug eluting stent placement if required  Further recommendations pending results of cardiac catheterization      Telemetry  Deep vein thrombosis prophylaxis/precautions  Appropriate diet, fluid, sodium, caffeine, stimulants intake   Questions were encouraged, asked and answered to the patient's  understanding and satisfaction.  Compliance to diet and medications       Yandel Lauren MD  03/28/22  14:01 CDT    EMR Dragon/Transcription was used to dictate part of this note

## 2022-06-23 ENCOUNTER — OFFICE VISIT (OUTPATIENT)
Dept: PULMONOLOGY | Facility: CLINIC | Age: 67
End: 2022-06-23

## 2022-06-23 VITALS
SYSTOLIC BLOOD PRESSURE: 130 MMHG | HEART RATE: 80 BPM | DIASTOLIC BLOOD PRESSURE: 70 MMHG | WEIGHT: 190 LBS | OXYGEN SATURATION: 96 % | BODY MASS INDEX: 31.65 KG/M2 | HEIGHT: 65 IN

## 2022-06-23 DIAGNOSIS — J43.8 OTHER EMPHYSEMA: ICD-10-CM

## 2022-06-23 DIAGNOSIS — J45.40 MODERATE PERSISTENT ASTHMA WITHOUT COMPLICATION: ICD-10-CM

## 2022-06-23 DIAGNOSIS — G47.33 OBSTRUCTIVE SLEEP APNEA: ICD-10-CM

## 2022-06-23 DIAGNOSIS — J44.9 COPD, MODERATE: Primary | ICD-10-CM

## 2022-06-23 PROCEDURE — 99214 OFFICE O/P EST MOD 30 MIN: CPT | Performed by: INTERNAL MEDICINE

## 2022-06-23 NOTE — PROGRESS NOTES
"Background:  Pt with dyspnea, FEV1 56% pred 2018, 67% pred 2021, gold B copd, asthma overlap, emphysema, olivier   Chief Complaint  COPD    Subjective    History of Present Illness       Feliberto Eli presents to [facility].  He tolerates high heat indexes poorly with increased dyspnea, alleviated by getting out of heat.  He uses cpap every night, sometimes in the day.  He had a heart cath since last visit with me.  There is family hx of aortic disease.     Tobacco Use: Low Risk    • Smoking Tobacco Use: Never Smoker   • Smokeless Tobacco Use: Never Used      Current Outpatient Medications   Medication Instructions   • acetaminophen (TYLENOL) 500 mg, Oral   • citalopram (CELEXA) 20 mg, Oral, Daily   • diltiaZEM CD (CARDIZEM CD) 240 MG 24 hr capsule No dose, route, or frequency recorded.   • diphenhydrAMINE (BENADRYL) 50 mg, Oral, 2 times daily   • Hydrocortisone, Perianal, (ANUSOL-HC) 2.5 % rectal cream No dose, route, or frequency recorded.   • lisinopril (PRINIVIL,ZESTRIL) 5 MG tablet No dose, route, or frequency recorded.   • naproxen (NAPROSYN) 500 mg, Oral, 2 Times Daily With Meals   • pravastatin (PRAVACHOL) 40 mg, Oral, Daily      Objective     Vital Signs:   /70   Pulse 80   Ht 165.1 cm (65\")   Wt 86.2 kg (190 lb)   SpO2 96% Comment: RA  BMI 31.62 kg/m²   Physical Exam  Constitutional:       Appearance: Normal appearance. He is not ill-appearing or diaphoretic.   Eyes:      Extraocular Movements: Extraocular movements intact.   Pulmonary:      Effort: Pulmonary effort is normal. No respiratory distress.      Breath sounds: No wheezing, rhonchi or rales.   Skin:     Findings: No erythema or rash.   Neurological:      Mental Status: He is alert.        Result Review  Data Reviewed:{ Labs  Result Review  Imaging  Media :23}       PFT Values        Some values may be hidden. Unless noted otherwise, only the newest values recorded on each date are " displayed.         Old Values PFT Results 7/21/21   No data to display.      Pre Drug PFT Results 7/21/21   FVC 83   FEV1 67   FEF 25-75% 29   FEV1/FVC 64.17      Post Drug PFT Results 7/21/21   No data to display.      Other Tests PFT Results 7/21/21   TLC 81   RV 94   DLCO 74   D/VAsb 102                      Assessment and Plan  {CC Problem List  Visit Diagnosis  ROS  Review (Popup)  Health Maintenance  Quality  BestPractice  Medications  SmartSets  SnapShot Encounters  Media :23}   Diagnoses and all orders for this visit:    1. COPD, moderate (HCC) (Primary)    2. Obstructive sleep apnea    3. Other emphysema (HCC)    4. Moderate persistent asthma without complication    will give trial of breztri for moderate increasingly symptomatic copd. he will let me know how it does.  Pt was provided sample of breztri inhaler.  Pt aware of proper inhaler technique already.  Recommend prevnar 20; not currently available.    Follow Up {Instructions Charge Capture  Follow-up Communications :23}   Return in about 6 months (around 12/23/2022).  Patient was given instructions and counseling regarding his condition or for health maintenance advice. Please see specific information pulled into the AVS if appropriate.    Electronically signed by Aman Joseph MD, 6/23/2022, 19:51 CDT

## 2022-12-01 PROBLEM — E66.811 CLASS 1 OBESITY DUE TO EXCESS CALORIES WITH SERIOUS COMORBIDITY AND BODY MASS INDEX (BMI) OF 31.0 TO 31.9 IN ADULT: Status: ACTIVE | Noted: 2022-12-01

## 2022-12-01 PROBLEM — E66.09 CLASS 1 OBESITY DUE TO EXCESS CALORIES WITH SERIOUS COMORBIDITY AND BODY MASS INDEX (BMI) OF 31.0 TO 31.9 IN ADULT: Status: ACTIVE | Noted: 2022-12-01

## 2022-12-01 PROBLEM — G47.33 OBSTRUCTIVE SLEEP APNEA: Chronic | Status: ACTIVE | Noted: 2019-04-21

## 2022-12-01 PROBLEM — E66.811 CLASS 1 OBESITY DUE TO EXCESS CALORIES WITH SERIOUS COMORBIDITY AND BODY MASS INDEX (BMI) OF 31.0 TO 31.9 IN ADULT: Chronic | Status: ACTIVE | Noted: 2022-12-01

## 2022-12-01 PROBLEM — J44.9 COPD, MODERATE: Chronic | Status: ACTIVE | Noted: 2019-04-21

## 2022-12-01 PROBLEM — E66.09 CLASS 1 OBESITY DUE TO EXCESS CALORIES WITH SERIOUS COMORBIDITY AND BODY MASS INDEX (BMI) OF 31.0 TO 31.9 IN ADULT: Chronic | Status: ACTIVE | Noted: 2022-12-01

## 2022-12-01 PROBLEM — J45.40 MODERATE PERSISTENT ASTHMA WITHOUT COMPLICATION: Chronic | Status: ACTIVE | Noted: 2019-04-21

## 2022-12-01 PROBLEM — M41.80 DEXTROSCOLIOSIS: Chronic | Status: ACTIVE | Noted: 2019-05-01

## 2022-12-01 NOTE — PROGRESS NOTES
"Chief Complaint  Asthma    Subjective    History of Present Illness     Feliberto Eli presents to [facility] for:    History of Present Illness  Management of of his asthma/COPD overlap.  Most recent FEV1 67. He has never smoked.  He is obese.  He has daily shortness of breath. His triggers are smoke, perfume and \"oil air fresheners\".  He has tried Spiriva which was not helpful.  He saw Dr. Joseph most recently.  He gave him Breztri to trial.  He indicates that was not helpful either.  He indicates since September he has had increased fatigue, sleeping during the day and shortness of breath.  He had 2 episodes of severe breathing difficulties.  1 while trying to carry a 25 pound bag of ice and pour into a container approximately the height of his shoulders.  The other episode while trying to lift his push mower out of the truck down to the ground.  Both episodes resulted in him having to stop and sit.  Shortness of breath resolves after approximately 30 seconds-1 minute of rest.  He did not use his rescue inhaler with these episodes.  He was concerned it may be related to his heart problem.  He requested an updated echo.He underwent heart catheterization in 2022 with diagnosis of moderate aortic stenosis.  Indicates he just saw Dr. Lauren last week after updated echo.  He was told everything appears to be stable. He has severe scoliosis which restricts his breathing.  He also has sleep apnea.  He is on CPAP.  He is compliant with this however he continues to report severe fatigue.  He indicates he typically wakes up feeling rested although very shortly after awakening he feels fatigue and sleepiness.  He does report napping throughout the day as a result.  He indicates his most recent sleep study was approximately 6 years ago.  He reports approximately 1 week ago he brought in a feral cat from outside due to cold temperatures.  He has been allowing it to stay indoors " "with him.  He has not noted that this is affected his shortness of breath at all.  He just wanted me to be aware.       Prior to Admission medications    Medication Sig Start Date End Date Taking? Authorizing Provider   acetaminophen (TYLENOL) 500 MG tablet Take 500 mg by mouth.    Lamonte Fox MD   citalopram (CeleXA) 20 MG tablet Take 20 mg by mouth Daily.    Lamonte Fox MD   diltiaZEM CD (CARDIZEM CD) 240 MG 24 hr capsule  4/1/19   Lamonte Fox MD   diphenhydrAMINE (BENADRYL) 25 mg capsule Take 50 mg by mouth 2 (two) times a day.    Lamonte Fox MD   Hydrocortisone, Perianal, (ANUSOL-HC) 2.5 % rectal cream  6/10/20   Lamonte Fox MD   lisinopril (PRINIVIL,ZESTRIL) 5 MG tablet  5/27/21   Lamonte Fox MD   naproxen (NAPROSYN) 500 MG tablet Take 500 mg by mouth 2 (Two) Times a Day With Meals. 4/1/19   Lamonte Fox MD   pravastatin (PRAVACHOL) 40 MG tablet Take 40 mg by mouth Daily. 4/1/19   Lamonte Fox MD       Social History     Socioeconomic History   • Marital status:    Tobacco Use   • Smoking status: Never   • Smokeless tobacco: Never   Substance and Sexual Activity   • Alcohol use: Defer   • Drug use: Defer   • Sexual activity: Defer       Objective   Vital Signs:   /72   Pulse 87   Ht 165.1 cm (65\")   Wt 88.5 kg (195 lb)   SpO2 94% Comment: RA  BMI 32.45 kg/m²     Physical Exam  Constitutional:       Interventions: Face mask in place.   Eyes:      Comments: glasses   Cardiovascular:      Rate and Rhythm: Normal rate and regular rhythm.      Heart sounds: Murmur heard.   Pulmonary:      Effort: Pulmonary effort is normal.      Breath sounds: Normal breath sounds.   Musculoskeletal:      Right lower leg: No edema.      Left lower leg: No edema.      Comments: Severe scoliosis   Neurological:      Mental Status: He is alert and oriented to person, place, and time.        Result Review :    PFT Values        Some values " may be hidden. Unless noted otherwise, only the newest values recorded on each date are displayed.         Old Values PFT Results 21   No data to display.      Pre Drug PFT Results 21   FVC 83   FEV1 67   FEF 25-75% 29   FEV1/FVC 64.17      Post Drug PFT Results 21   No data to display.      Other Tests PFT Results 21   TLC 81   RV 94   DLCO 74   D/VAsb 102           My interpretation of the PFT: none    Results for orders placed in visit on 21    Pulmonary Function Test    Narrative  Pulmonary Function Test  Performed by: Naomy Gleason, RRT  Authorized by: Aman Joseph MD    Pre Drug % Predicted  FVC: 83%  FEV1: 67%  FEF 25-75%: 29%  FEV1/FVC: 64.17%  T%  RV: 94%  DLCO: 74%  D/VAsb: 102%    Interpretation  Spirometry  Spirometry shows moderate obstruction. There is reduced midflow suggesting small airway/airflow obstruction.  Review of FVL curve  Patient's effort is normal.  Lung Volume Measurements  Measurements show normal results.  Diffusion Capacity  The patient's diffusion capacity is normal.  Diffusion capacity is normal when corrected for alveolar volume.      Results for orders placed in visit on 19    Pulmonary Function Test    Narrative  Pulmonary Function Test  Performed by: Aman Joseph MD  Authorized by: Aman Joseph MD    Pre Drug  FVC: 58%  FEV1: 49%  FEF 25-75%: 26%  FEV1/FVC: 67.44%    My interpretation of imaging:  none    My interpretation of labs: none      Assessment and Plan     Diagnoses and all orders for this visit:    1. Moderate persistent asthma without complication (Primary)  Comments:  Trial of Trelegy.  Use of device demonstrated.  If it is helpful he will call for prescription.  If it is not he should consider other alternatives due to PFTs  Orders:  -     Fluticasone-Umeclidin-Vilant (Trelegy Ellipta) 100-62.5-25 MCG/ACT inhaler; Inhale 1 puff Daily for 14 days.  Dispense: 1 each; Refill: 0    2. COPD, moderate  ([facility])  Comments:  Trelegy trial.  If not beneficial consider other alternatives. Trialed and failed Spiriva and Breztri. Walking oximetry testing before his fu with Dr. Joseph  Orders:  -     Fluticasone-Umeclidin-Vilant (Trelegy Ellipta) 100-62.5-25 MCG/ACT inhaler; Inhale 1 puff Daily for 14 days.  Dispense: 1 each; Refill: 0    3. Dextroscoliosis  Comments:  Suspect main contributor to her shortness of breath as both episodes occurred while he was trying to bend over or  heavy object    4. Obstructive sleep apnea  Comments:  Compliant with CPAP.  Still having fatigue and daytime sleepiness.  We will update titration study and see if he needs a different device.  Orders:  -     Polysomnography 4 or More Parameters With CPAP; Future    5. Class 1 obesity due to excess calories with serious comorbidity and body mass index (BMI) of 31.0 to 31.9 in adult  Comments:  Also contributes to underlying conditions.  Education material provided    6. Encounter for immunization  Comments:  Preventative maintenance provided.  Prevnar 20 given.  No unwanted side effects noted while monitoring.  Orders:  -     Pneumococcal Conjugate Vaccine 20-Valent (PCV20)    7. Other fatigue  Comments:  Recommend updating sleep study via CPAP titration    8. Moderate aortic stenosis  Comments:  Contributes to shortness of breath although most recent echo showing this to be stable.  Defer monitoring to cardiology      Body mass index is 32.45 kg/m². Educational material provided after visit summary about elevated BMI    Suspect his shortness of breath fatigue is a combination problem of some airway issues, restriction from his scoliosis and his aortic stenosis.  We will try different bronchodilator therapy.  We will update a sleep study given his ongoing fatigue.  If inhalers not beneficial and sleep study therapy optimized and patient is still short of breath I recommend O2 evaluation and updating PFTs.  We did discuss that he may not  ever show significant improvement due to multiple contributors    Gabriella Thomas, APRN  12/28/2022  12:02 CST    Follow Up   Return in about 3 months (around 3/28/2023) for MAKE APPT WITH DR XAVIER. 6MW BEFORE APPT, FVL with diffusion.    Patient was given instructions and counseling regarding his condition or for health maintenance advice. Please see specific information pulled into the AVS if appropriate.

## 2022-12-28 ENCOUNTER — OFFICE VISIT (OUTPATIENT)
Dept: PULMONOLOGY | Facility: CLINIC | Age: 67
End: 2022-12-28

## 2022-12-28 VITALS
SYSTOLIC BLOOD PRESSURE: 128 MMHG | OXYGEN SATURATION: 94 % | HEIGHT: 65 IN | BODY MASS INDEX: 32.49 KG/M2 | DIASTOLIC BLOOD PRESSURE: 72 MMHG | HEART RATE: 87 BPM | WEIGHT: 195 LBS

## 2022-12-28 DIAGNOSIS — E66.09 CLASS 1 OBESITY DUE TO EXCESS CALORIES WITH SERIOUS COMORBIDITY AND BODY MASS INDEX (BMI) OF 31.0 TO 31.9 IN ADULT: Chronic | ICD-10-CM

## 2022-12-28 DIAGNOSIS — R53.83 OTHER FATIGUE: Chronic | ICD-10-CM

## 2022-12-28 DIAGNOSIS — Z23 ENCOUNTER FOR IMMUNIZATION: ICD-10-CM

## 2022-12-28 DIAGNOSIS — M41.80 DEXTROSCOLIOSIS: Chronic | ICD-10-CM

## 2022-12-28 DIAGNOSIS — I35.0 MODERATE AORTIC STENOSIS: Chronic | ICD-10-CM

## 2022-12-28 DIAGNOSIS — G47.33 OBSTRUCTIVE SLEEP APNEA: Chronic | ICD-10-CM

## 2022-12-28 DIAGNOSIS — J44.9 COPD, MODERATE: Chronic | ICD-10-CM

## 2022-12-28 DIAGNOSIS — J45.40 MODERATE PERSISTENT ASTHMA WITHOUT COMPLICATION: Primary | Chronic | ICD-10-CM

## 2022-12-28 PROCEDURE — 90471 IMMUNIZATION ADMIN: CPT | Performed by: NURSE PRACTITIONER

## 2022-12-28 PROCEDURE — 90677 PCV20 VACCINE IM: CPT | Performed by: NURSE PRACTITIONER

## 2022-12-28 PROCEDURE — 99214 OFFICE O/P EST MOD 30 MIN: CPT | Performed by: NURSE PRACTITIONER

## 2022-12-28 PROCEDURE — 94664 DEMO&/EVAL PT USE INHALER: CPT | Performed by: NURSE PRACTITIONER

## 2022-12-28 RX ORDER — FLUTICASONE FUROATE, UMECLIDINIUM BROMIDE AND VILANTEROL TRIFENATATE 100; 62.5; 25 UG/1; UG/1; UG/1
1 POWDER RESPIRATORY (INHALATION)
Qty: 1 EACH | Refills: 0 | COMMUNITY
Start: 2022-12-28 | End: 2023-01-11

## 2022-12-28 RX ORDER — ALBUTEROL SULFATE 90 UG/1
2 AEROSOL, METERED RESPIRATORY (INHALATION) EVERY 4 HOURS PRN
COMMUNITY

## 2023-01-10 ENCOUNTER — TELEPHONE (OUTPATIENT)
Dept: PULMONOLOGY | Facility: CLINIC | Age: 68
End: 2023-01-10
Payer: COMMERCIAL

## 2023-01-10 NOTE — TELEPHONE ENCOUNTER
[place] Sleepiness Scale    Situation Chance of Dozing or Sleeping   • Sitting and reading 1 - slight chance of dosing or sleeping   • Watching TV 1 - slight chance of dosing or sleeping   • Sitting inactive in a public place 0 - would never dose or sleep   • Being a passenger in a motor vehicle for an hour or more 0 - would never dose or sleep   • Lying down in the afternoon 2 - moderate chance of dosing or sleeping   • Sitting and talking to someone 0 - would never dose or sleep   • Sitting quietly after lunch (no alcohol) 0 - would never dose or sleep   • Stopped for a few minutes in traffic while driving 0 - would never dose or sleep   Total score (add the scores up) 4

## 2023-03-16 ENCOUNTER — HOSPITAL ENCOUNTER (OUTPATIENT)
Dept: SLEEP MEDICINE | Facility: HOSPITAL | Age: 68
Discharge: HOME | End: 2023-03-16
Admitting: NURSE PRACTITIONER
Payer: COMMERCIAL

## 2023-03-16 VITALS
WEIGHT: 195 LBS | SYSTOLIC BLOOD PRESSURE: 144 MMHG | HEIGHT: 65 IN | DIASTOLIC BLOOD PRESSURE: 71 MMHG | BODY MASS INDEX: 32.49 KG/M2 | RESPIRATION RATE: 14 BRPM | HEART RATE: 77 BPM

## 2023-03-16 DIAGNOSIS — G47.33 OBSTRUCTIVE SLEEP APNEA: ICD-10-CM

## 2023-03-16 PROCEDURE — 95811 POLYSOM 6/>YRS CPAP 4/> PARM: CPT

## 2023-03-16 PROCEDURE — 95811 POLYSOM 6/>YRS CPAP 4/> PARM: CPT | Performed by: INTERNAL MEDICINE

## 2023-03-24 ENCOUNTER — OFFICE VISIT (OUTPATIENT)
Dept: PULMONOLOGY | Facility: CLINIC | Age: 68
End: 2023-03-24
Payer: COMMERCIAL

## 2023-03-24 DIAGNOSIS — J44.9 COPD, MODERATE: Primary | ICD-10-CM

## 2023-03-24 DIAGNOSIS — R09.02 EXERCISE HYPOXEMIA: ICD-10-CM

## 2023-03-24 PROCEDURE — 94618 PULMONARY STRESS TESTING: CPT | Performed by: NURSE PRACTITIONER

## 2023-03-24 NOTE — PROCEDURES
6 Minute Walk Test  Performed by: Gigi Dyer CMA  Authorized by: Gabriella Thomas APRN     General:     - Medical History Checked      - Medical clearance provided for the patient to participate in exercise testing      Contraindications:    - No contraindications identified       Exercise Details     Supplemental oxygen:  NA    Mobility aid:  NA    Hallway: Total Feet:  756    Miles:  0.14    Meters:  230.43    Rest, Exercise, Recovery Readings    Rest     BP: 122/70    SAT: 97%    O2: RA    HR: 72    RPE: 1   Finish     BP: 132/80    SAT: 93%    O2: RA    HR: 103    RPE: 3   Recovery 1     BP: 128/78    SAT: 97%    O2: RA    HR: 83    RPE:  2   Recovery 2     BP:  126/78    SAT:  97%    O2: RA    HR:  82    RPE: 2    Minute by Minute Recordings    1st Minute     SAT: 93%    O2: RA    HR: 103    RPE:  3   2nd minute     SAT: 90%    O2: RA    HR: 92    RPE: 3   3rd minute     SAT: 90%    O2: RA    HR: 110    RPE: 4   4th minute     SAT: 88%    O2: RA    HR: 118    RPE: 4   5th minute     SAT: 94%    O2: 2L    HR: 107    RPE: 4   6th minute     SAT: 94%    O2: 2L    HR: 91    RPE: 3    Was test terminated?: No        Technician:  Gigi Dyer CMA       Overall notes:  Patient walked in the hallway for 6 minutes. His total distance walked was 756 feet or 230.43 meters. His oxygen level did drop to 88% at minute 4 and he was placed on 2L of oxygen. He rested for about 15-20 seconds and continued the walk on 2L. Patient is aware that he qualified for home oxygen. He would like to use [employer] since they supply his pap machine.

## 2023-04-06 ENCOUNTER — PROCEDURE VISIT (OUTPATIENT)
Dept: PULMONOLOGY | Facility: CLINIC | Age: 68
End: 2023-04-06
Payer: COMMERCIAL

## 2023-04-06 ENCOUNTER — OFFICE VISIT (OUTPATIENT)
Dept: PULMONOLOGY | Facility: CLINIC | Age: 68
End: 2023-04-06
Payer: COMMERCIAL

## 2023-04-06 VITALS
HEIGHT: 63 IN | HEART RATE: 70 BPM | DIASTOLIC BLOOD PRESSURE: 80 MMHG | BODY MASS INDEX: 34.38 KG/M2 | SYSTOLIC BLOOD PRESSURE: 142 MMHG | WEIGHT: 194 LBS | OXYGEN SATURATION: 98 %

## 2023-04-06 DIAGNOSIS — M41.80 DEXTROSCOLIOSIS: ICD-10-CM

## 2023-04-06 DIAGNOSIS — R09.02 EXERCISE HYPOXEMIA: ICD-10-CM

## 2023-04-06 DIAGNOSIS — G47.33 OBSTRUCTIVE SLEEP APNEA: ICD-10-CM

## 2023-04-06 DIAGNOSIS — I35.0 MODERATE AORTIC STENOSIS: ICD-10-CM

## 2023-04-06 DIAGNOSIS — J44.9 COPD, MODERATE: Primary | ICD-10-CM

## 2023-04-06 DIAGNOSIS — J45.40 MODERATE PERSISTENT ASTHMA WITHOUT COMPLICATION: ICD-10-CM

## 2023-04-06 PROCEDURE — 99215 OFFICE O/P EST HI 40 MIN: CPT | Performed by: INTERNAL MEDICINE

## 2023-04-06 PROCEDURE — 94375 RESPIRATORY FLOW VOLUME LOOP: CPT | Performed by: INTERNAL MEDICINE

## 2023-04-06 PROCEDURE — 94729 DIFFUSING CAPACITY: CPT | Performed by: INTERNAL MEDICINE

## 2023-04-06 NOTE — PROCEDURES
Pulmonary Function Test  Performed by: Naomy Gleason, RRT  Authorized by: Aman Joseph MD      Pre Drug % Predicted    FVC: 63%   FEV1: 54%   FEF 25-75%: 37%   FEV1/FVC: 68%   DLCO: 67%   D/VAsb: 99%    Interpretation   Spirometry   Spirometry shows moderate obstruction. There is reduced midflow suggesting small airway/airflow obstruction.   Review of FVL curve   Patient's effort is normal.   Diffusion Capacity  The patient's diffusion capacity is mildly reduced.  Diffusion capacity is normal when corrected for alveolar volume.

## 2023-04-06 NOTE — PROGRESS NOTES
Background:  Pt with dyspnea, FEV1 56% pred 2018, 67% pred 2021, gold B copd, asthma overlap, emphysema, olivier   Chief Complaint  COPD    Subjective    History of Present Illness     Feliberto Eli is here for follow up with [facility].  History of Present Illness  Pt follows up for copd and has hx aortic valve disease.  He feels he is doing well.  He reports some chronic bronchitic sx. From September to December he was very sedentary worrying about his heart.  Dr. Lauren gave him a favorable report and he has been doing more now.  He says Dr. Lauren told him his heart is doing really well for his age.  He says his compliance would have been 100% except the night he missed was the one he spent doing his follow up cpap sleep study.  He feels his new cpap machine is much better, set on 6 cm and his new mask is better.  He uses the rescue inhaler from time to time, infrequently.  He feels he is doing much better using cpap now.  Exercise sat was 88 on room air, 94 with oxygen.  He cant sleep on his left side because he cant ventilate the right lung very well.  He had corrective surgery for scoliosis at an early age.  He is bothered about cold air, wants to get out more and do some walking in a park as the weather warms up. He wants to work.  He had many questions today     Tobacco Use: Low Risk    • Smoking Tobacco Use: Never   • Smokeless Tobacco Use: Never   • Passive Exposure: Not on file      Current Outpatient Medications   Medication Instructions   • acetaminophen (TYLENOL) 500 mg, Oral   • albuterol sulfate  (90 Base) MCG/ACT inhaler 2 puffs, Inhalation, Every 4 Hours PRN   • citalopram (CELEXA) 40 mg, Oral, Daily   • diltiaZEM CD (CARDIZEM CD) 240 MG 24 hr capsule No dose, route, or frequency recorded.   • diphenhydrAMINE (BENADRYL) 50 mg, Oral, 2 times daily   • Hydrocortisone, Perianal, (ANUSOL-HC) 2.5 % rectal cream No dose, route, or frequency recorded.  "  • lisinopril (PRINIVIL,ZESTRIL) 5 MG tablet No dose, route, or frequency recorded.   • naproxen (NAPROSYN) 500 mg, Oral, 2 Times Daily With Meals   • pravastatin (PRAVACHOL) 40 mg, Oral, Daily      Objective     Vital Signs:   /80   Pulse 70   Ht 160 cm (63\")   Wt 88 kg (194 lb)   SpO2 98% Comment: RA  BMI 34.37 kg/m²   Physical Exam  Constitutional:       Appearance: Normal appearance. He is not ill-appearing or diaphoretic.   Eyes:      Extraocular Movements: Extraocular movements intact.   Pulmonary:      Effort: Pulmonary effort is normal. No respiratory distress.      Breath sounds: No wheezing, rhonchi or rales.   Skin:     Findings: No erythema or rash.   Neurological:      Mental Status: He is alert.        Result Review  Data Reviewed:    Polysomnography 4 or More Parameters With CPAP    Result Date: 3/30/2023  Impression:   1.  Obstructive sleep apnea, successfully treated with CPAP at a pressure of 6 cm. 2.  Hypersomnolence which may relate in part to poor sleep efficiency.  The patient again did not reach stage REM sleep. 3.  Obesity. Recommendation: 1.  If a recent compliance download has not been obtained would recommend doing so to ensure that the patient is currently compliant with his device and that there are not issues such as excessive leak which may result in poor control of his apneas.  Otherwise he can continue on and auto CPAP device and at least based on his previous compliance report his mean CPAP pressure was 6.7 cm which correlates well with his titration study in terms of the effective level of CPAP. 2.  If adequate compliance and good control the patient's apneas is documented on a compliance download, would recommend assessing him for other potential causes of excessive daytime somnolence and fatigue. 3.  Weight loss is encouraged. 4.  The patient should not drive if drowsy. Reece Palm MD 03/30/23 14:36 CDT Dictated utilizing Dragon voice recognition software "       PFT Values        Some values may be hidden. Unless noted otherwise, only the newest values recorded on each date are displayed.         Old Values PFT Results 7/21/21 4/6/23   No data to display.      Pre Drug PFT Results 7/21/21 4/6/23   FVC 83 63   FEV1 67 54   FEF 25-75% 29 37   FEV1/FVC 64.17 68      Post Drug PFT Results 7/21/21 4/6/23   No data to display.      Other Tests PFT Results 7/21/21 4/6/23   TLC 81    RV 94    DLCO 74 67   D/VAsb 102 99                      Assessment and Plan    Diagnoses and all orders for this visit:    1. COPD, moderate (Primary)  -     Pulmonary Function Test    2. Exercise hypoxemia    3. Moderate persistent asthma without complication    4. Dextroscoliosis    5. Obstructive sleep apnea    6. Moderate aortic stenosis    he is doing as well as possible considering severe mechanical thoracic deformity and obstructive lung disease  He is restricted by scoliosis  He is also obstructed, benefiting from albuterol and just wants to keep with this  Benefiting and compliant with cpap; compliance report reviewed  Call for increasing resp sx; would warrant intensifying asthma med with ics or ics/laba  Use portable oxygen for walks and other exertional activity  Monitor saturation with exertion including digging, working and carrying loads.  I recommended getting pulse oximeter to monitor saturation, to better past himself and avoid repeated complete exhaustion, and I also encouraged him to continue to strive to maintain his active lifestyle.  65 minutes spent caring for patient and dealing with the above problems today  Follow Up   Return in about 2 months (around 6/6/2023). reassess compliance with cpap  Patient was given instructions and counseling regarding his condition or for health maintenance advice. Please see specific information pulled into the AVS if appropriate.    Electronically signed by Aman Joseph MD, 4/6/2023, 22:46 CDT

## 2023-05-15 NOTE — PROGRESS NOTES
"Chief Complaint  Asthma    Subjective    History of Present Illness     Feliberto Eli presents to [facility] for:    History of Present Illness  Management of of his asthma/COPD overlap.  Breathing also restricted by severe dextroscoliosis.  Most recent FEV1 54. He has never smoked.  He is obese.  He has daily shortness of breath. His triggers are smoke, perfume and \"oil air fresheners\".  He has tried Spiriva, Trelegy and Breztri.  Neither of these were beneficial.  He prefers to have an albuterol inhaler he can use when needed.  He seldom requires it.  He has sleep apnea.  Recent updated titration.  He did not reach REM sleep.  Ongoing treatment with auto titrating CPAP recommended.  He has been compliant with this.  So far he is benefiting without issue. Recently evaluated for oxygen by Dr. Joseph.  He qualified for 2 L with exertion.  He is utilizing this when needed to keep saturation above 88 with good benefit.  He is followed by Dr. Lauren.  He has a history of aortic stenosis which contributes some to his shortness of breath.      Prior to Admission medications    Medication Sig Start Date End Date Taking? Authorizing Provider   acetaminophen (TYLENOL) 500 MG tablet Take 1 tablet by mouth.    Lamonte Fox MD   albuterol sulfate  (90 Base) MCG/ACT inhaler Inhale 2 puffs Every 4 (Four) Hours As Needed for Wheezing.    Lamonte Fox MD   citalopram (CeleXA) 20 MG tablet Take 2 tablets by mouth Daily.    Lamonte Fox MD   diltiaZEM CD (CARDIZEM CD) 240 MG 24 hr capsule  4/1/19   Lamonte Fox MD   diphenhydrAMINE (BENADRYL) 25 mg capsule Take 2 capsules by mouth 2 (two) times a day.    Lamonte Fox MD   Hydrocortisone, Perianal, (ANUSOL-HC) 2.5 % rectal cream  6/10/20   Lamonte Fox MD   lisinopril (PRINIVIL,ZESTRIL) 5 MG tablet  5/27/21   Lamonte Fox MD   naproxen (NAPROSYN) 500 MG tablet Take 1 " "tablet by mouth 2 (Two) Times a Day With Meals. 4/1/19   ProviderLamonte MD   pravastatin (PRAVACHOL) 40 MG tablet Take 1 tablet by mouth Daily. 4/1/19   ProviderLamonte MD       Social History     Socioeconomic History    Marital status:    Tobacco Use    Smoking status: Never    Smokeless tobacco: Never   Substance and Sexual Activity    Alcohol use: Never    Drug use: Never    Sexual activity: Not Currently     Partners: Female     Birth control/protection: Surgical, Abstinence       Objective   Vital Signs:   /82   Pulse 73   Ht 160 cm (63\")   Wt 84.4 kg (186 lb)   SpO2 95% Comment: RA  BMI 32.95 kg/m²     Physical Exam  Constitutional:       Interventions: Face mask in place.   Eyes:      Comments: Glasses   Cardiovascular:      Rate and Rhythm: Normal rate and regular rhythm.      Heart sounds: No murmur heard.  Pulmonary:      Effort: Pulmonary effort is normal.      Breath sounds: Normal breath sounds.   Musculoskeletal:      Right lower leg: No edema.      Left lower leg: No edema.      Comments: Severe scoliosis   Neurological:      Mental Status: He is alert and oriented to person, place, and time.      Result Review :    PFT Values          7/21/2021    11:30 4/6/2023    15:30   Pre Drug PFT Results   FVC 83 63   FEV1 67 54   FEF 25-75% 29 37   FEV1/FVC 64.17 68   Other Tests PFT Results   TLC 81    RV 94    DLCO 74 67   D/VAsb 102 99     My interpretation of the PFT: none    Results for orders placed in visit on 04/06/23    Pulmonary Function Test    Narrative  Pulmonary Function Test  Performed by: Naomy Gleason, RRT  Authorized by: Aman Joseph MD    Pre Drug % Predicted  FVC: 63%  FEV1: 54%  FEF 25-75%: 37%  FEV1/FVC: 68%  DLCO: 67%  D/VAsb: 99%    Interpretation  Spirometry  Spirometry shows moderate obstruction. There is reduced midflow suggesting small airway/airflow obstruction.  Review of FVL curve  Patient's effort is normal.  Diffusion " Capacity  The patient's diffusion capacity is mildly reduced.  Diffusion capacity is normal when corrected for alveolar volume.      Results for orders placed in visit on 21    Pulmonary Function Test    Narrative  Pulmonary Function Test  Performed by: Naomy Gleason RRT  Authorized by: Aman Joseph MD    Pre Drug % Predicted  FVC: 83%  FEV1: 67%  FEF 25-75%: 29%  FEV1/FVC: 64.17%  T%  RV: 94%  DLCO: 74%  D/VAsb: 102%    Interpretation  Spirometry  Spirometry shows moderate obstruction. There is reduced midflow suggesting small airway/airflow obstruction.  Review of FVL curve  Patient's effort is normal.  Lung Volume Measurements  Measurements show normal results.  Diffusion Capacity  The patient's diffusion capacity is normal.  Diffusion capacity is normal when corrected for alveolar volume.      Results for orders placed in visit on 19    Pulmonary Function Test    Narrative  Pulmonary Function Test  Performed by: Aman Joseph MD  Authorized by: Aman Joesph MD    Pre Drug  FVC: 58%  FEV1: 49%  FEF 25-75%: 26%  FEV1/FVC: 67.44%    My interpretation of imaging:  none    My interpretation of labs: none      Assessment and Plan     Diagnoses and all orders for this visit:    1. Mild persistent asthma without complication (Primary)  Comments:  Continue to use albuterol as needed, not requiring it excessively.    2. COPD, moderate (HCC)  Comments:  Recent PFTs stable.  Not requiring maintenance therapy.  Did not benefit.  Using albuterol minimally    3. Dextroscoliosis  Comments:  Contributes significantly to his restriction shortness of breath    4. Class 1 obesity due to excess calories with serious comorbidity and body mass index (BMI) of 31.0 to 31.9 in adult  Comments:  Contributes to restriction.  Education material provided    5. Moderate aortic stenosis  Comments:  Contributes to fatigue and shortness of breath, defer follow-up to cardiology    6. Obstructive  sleep apnea  Comments:  He is compliant and benefiting.  Recent sleep study not helpful given lack of REM sleep.  We will repeat if symptoms worsen        Body mass index is 32.95 kg/m². Educational material provided after visit summary about elevated BMI        Charting time spent: 6  Bedside time spent: 6    GILSON Hernández  6/8/2023  14:32 CDT    Follow Up   Return in about 6 months (around 12/8/2023).    Patient was given instructions and counseling regarding his condition or for health maintenance advice. Please see specific information pulled into the AVS if appropriate.

## 2023-06-08 ENCOUNTER — OFFICE VISIT (OUTPATIENT)
Dept: PULMONOLOGY | Facility: CLINIC | Age: 68
End: 2023-06-08
Payer: COMMERCIAL

## 2023-06-08 VITALS
HEIGHT: 63 IN | HEART RATE: 73 BPM | SYSTOLIC BLOOD PRESSURE: 132 MMHG | DIASTOLIC BLOOD PRESSURE: 82 MMHG | BODY MASS INDEX: 32.96 KG/M2 | OXYGEN SATURATION: 95 % | WEIGHT: 186 LBS

## 2023-06-08 DIAGNOSIS — J44.9 COPD, MODERATE: Chronic | ICD-10-CM

## 2023-06-08 DIAGNOSIS — G47.33 OBSTRUCTIVE SLEEP APNEA: Chronic | ICD-10-CM

## 2023-06-08 DIAGNOSIS — J45.30 MILD PERSISTENT ASTHMA WITHOUT COMPLICATION: Primary | Chronic | ICD-10-CM

## 2023-06-08 DIAGNOSIS — I35.0 MODERATE AORTIC STENOSIS: Chronic | ICD-10-CM

## 2023-06-08 DIAGNOSIS — E66.09 CLASS 1 OBESITY DUE TO EXCESS CALORIES WITH SERIOUS COMORBIDITY AND BODY MASS INDEX (BMI) OF 31.0 TO 31.9 IN ADULT: Chronic | ICD-10-CM

## 2023-06-08 DIAGNOSIS — M41.80 DEXTROSCOLIOSIS: Chronic | ICD-10-CM

## 2023-11-06 PROBLEM — J43.8 OTHER EMPHYSEMA: Status: RESOLVED | Noted: 2019-04-21 | Resolved: 2023-11-06

## 2023-12-15 ENCOUNTER — TELEPHONE (OUTPATIENT)
Dept: PULMONOLOGY | Facility: CLINIC | Age: 68
End: 2023-12-15
Payer: COMMERCIAL

## 2023-12-15 NOTE — TELEPHONE ENCOUNTER
I called the patient to reschedule the cancelled appointment.  The patient has given the following reason for why the cancelled appointment has not been rescheduled: Attempted to call patient, sent letter, notified PCP (if NEW PATIENT). Unable to reschedule.

## 2024-03-14 ENCOUNTER — TELEPHONE (OUTPATIENT)
Dept: PULMONOLOGY | Facility: CLINIC | Age: 69
End: 2024-03-14

## 2024-03-18 NOTE — PROGRESS NOTES
"Chief Complaint  Asthma    Subjective    History of Present Illness {CC  Problem List  Visit Diagnosis   Encounters  Notes  Medications  Labs  Result Review Imaging  Media: 23}    Feliberto Eli presents to [facility] for:    History of Present Illness  Management of of his asthma/COPD overlap.  Breathing also restricted by severe dextroscoliosis.  Most recent FEV1 54. He has never smoked.  He is obese.      He has daily shortness of breath. His triggers are smoke, perfume and \"oil air fresheners\".  He has tried Spiriva, Trelegy and Breztri.  Neither of these were beneficial.  He prefers to have an albuterol inhaler he can use when needed.  He seldom requires it.  He does not need refills.    He has sleep apnea.  Recent updated titration.  He did not reach REM sleep.  Ongoing treatment with auto titrating CPAP recommended.  He has been compliant with this.  So far he is benefiting without issue.     Evaluated for oxygen by Dr. Joseph.  He qualified for 2 L with exertion.  Patient indicates he recently lost his Medicaid coverage.  He now has Medicare primary.  He is applied for Medicaid secondary.  In the process he lost his oxygen concentrator and portable tanks.  They allowed him to keep his CPAP because \"it was already paid for\".  He is wanting to be requalified for the oxygen but not until his insurance issues have resolved.    He is followed by Dr. Lauren.  He has a history of aortic stenosis which contributes some to his shortness of breath.  He recently had a follow-up echo.  It was stable.       Prior to Admission medications    Medication Sig Start Date End Date Taking? Authorizing Provider   acetaminophen (TYLENOL) 500 MG tablet Take 2 tablets by mouth 2 (Two) Times a Day.    ProviderLamonte MD   albuterol sulfate  (90 Base) MCG/ACT inhaler Inhale 2 puffs Every 4 (Four) Hours As Needed for Wheezing.    ProviderLamonte MD " "  citalopram (CeleXA) 20 MG tablet Take 2 tablets by mouth Daily.    Lamonte Fox MD   diltiaZEM CD (CARDIZEM CD) 240 MG 24 hr capsule  4/1/19   Lamonte Fox MD   diphenhydrAMINE (BENADRYL) 25 mg capsule Take 2 capsules by mouth 2 (two) times a day.    Lamonte Fox MD   Hydrocortisone, Perianal, (ANUSOL-HC) 2.5 % rectal cream  6/10/20   Lamonte Fox MD   lisinopril (PRINIVIL,ZESTRIL) 5 MG tablet  5/27/21   Lamonte Fox MD   naproxen (NAPROSYN) 500 MG tablet Take 1 tablet by mouth 2 (Two) Times a Day With Meals. 4/1/19   Lamonte Fox MD   pravastatin (PRAVACHOL) 40 MG tablet Take 1 tablet by mouth Daily. 4/1/19   Lamonte Fox MD       Social History     Socioeconomic History    Marital status:    Tobacco Use    Smoking status: Never    Smokeless tobacco: Never   Substance and Sexual Activity    Alcohol use: Never    Drug use: Never    Sexual activity: Not Currently     Partners: Female     Birth control/protection: Surgical, Abstinence       Objective   Vital Signs:   /78   Pulse 83   Ht 160 cm (63\")   Wt 84.4 kg (186 lb)   SpO2 94% Comment: RA  BMI 32.95 kg/m²     Physical Exam  Eyes:      Comments: glasses   Cardiovascular:      Rate and Rhythm: Normal rate and regular rhythm.      Heart sounds: No murmur heard.  Pulmonary:      Effort: Pulmonary effort is normal.      Breath sounds: Normal breath sounds.   Musculoskeletal:      Right lower leg: No edema.      Left lower leg: No edema.      Comments: scoliosis   Neurological:      Mental Status: He is alert and oriented to person, place, and time.        Result Review :    PFT Values          4/6/2023    15:30   Pre Drug PFT Results   FVC 63   FEV1 54   FEF 25-75% 37   FEV1/FVC 68   Other Tests PFT Results   DLCO 67   D/VAsb 99     My interpretation of the PFT : none    Results for orders placed in visit on 04/06/23    Pulmonary Function Test    Narrative  Pulmonary Function " Test  Performed by: Naomy Gleason, RRT  Authorized by: Aman Joseph MD    Pre Drug % Predicted  FVC: 63%  FEV1: 54%  FEF 25-75%: 37%  FEV1/FVC: 68%  DLCO: 67%  D/VAsb: 99%    Interpretation  Spirometry  Spirometry shows moderate obstruction. There is reduced midflow suggesting small airway/airflow obstruction.  Review of FVL curve  Patient's effort is normal.  Diffusion Capacity  The patient's diffusion capacity is mildly reduced.  Diffusion capacity is normal when corrected for alveolar volume.      Results for orders placed in visit on 21    Pulmonary Function Test    Narrative  Pulmonary Function Test  Performed by: Naomy Gleason RRT  Authorized by: Aman Joseph MD    Pre Drug % Predicted  FVC: 83%  FEV1: 67%  FEF 25-75%: 29%  FEV1/FVC: 64.17%  T%  RV: 94%  DLCO: 74%  D/VAsb: 102%    Interpretation  Spirometry  Spirometry shows moderate obstruction. There is reduced midflow suggesting small airway/airflow obstruction.  Review of FVL curve  Patient's effort is normal.  Lung Volume Measurements  Measurements show normal results.  Diffusion Capacity  The patient's diffusion capacity is normal.  Diffusion capacity is normal when corrected for alveolar volume.      Results for orders placed in visit on 19    Pulmonary Function Test    Narrative  Pulmonary Function Test  Performed by: Aman Joseph MD  Authorized by: Aman Joseph MD    Pre Drug  FVC: 58%  FEV1: 49%  FEF 25-75%: 26%  FEV1/FVC: 67.44%      My interpretation of imaging:  none    My interpretation of labs: none      Assessment and Plan {CC Problem List  Visit Diagnosis  ROS  Review (Popup)  [facility] Maintenance  Quality  BestPractice  Medications  SmartSets  SnapShot Encounters  Media : 23}    Diagnoses and all orders for this visit:    1. Mild intermittent asthma without complication (Primary)  Comments:  He does not require maintenance therapy.  Seldom requires albuterol.  No  refills needed.  PFTs with follow-up    2. COPD, moderate  Comments:  He does not require maintenance therapy. Seldom requires albuterol. No refills needed. PFTs with follow-up    3. Obstructive sleep apnea  Comments:  Compliant with CPAP and benefiting.  He should continue.  He is in need of more supplies.  Will send order to DME.  Orders:  -     PAP Therapy    4. Dextroscoliosis  Comments:  Contributes significantly to shortness of breath    5. Class 1 obesity due to excess calories with serious comorbidity and body mass index (BMI) of 31.0 to 31.9 in adult  Comments:  Contributes to shortness of breath.  Education material provided    6. Moderate aortic stenosis  Comments:  Contributes to shortness of breath, stable          Body mass index is 32.95 kg/m². Educational material provided after visit summary about elevated BMI      GILSON Hernández  4/2/2024  16:20 CDT    Follow Up   Return in about 3 months (around 7/2/2024) for FVL.    Patient was given instructions and counseling regarding his condition or for health maintenance advice. Please see specific information pulled into the AVS if appropriate.

## 2024-04-02 ENCOUNTER — OFFICE VISIT (OUTPATIENT)
Dept: PULMONOLOGY | Facility: CLINIC | Age: 69
End: 2024-04-02
Payer: MEDICARE

## 2024-04-02 VITALS
SYSTOLIC BLOOD PRESSURE: 126 MMHG | WEIGHT: 186 LBS | HEART RATE: 83 BPM | BODY MASS INDEX: 32.96 KG/M2 | OXYGEN SATURATION: 94 % | HEIGHT: 63 IN | DIASTOLIC BLOOD PRESSURE: 78 MMHG

## 2024-04-02 DIAGNOSIS — E66.09 CLASS 1 OBESITY DUE TO EXCESS CALORIES WITH SERIOUS COMORBIDITY AND BODY MASS INDEX (BMI) OF 31.0 TO 31.9 IN ADULT: Chronic | ICD-10-CM

## 2024-04-02 DIAGNOSIS — I35.0 MODERATE AORTIC STENOSIS: Chronic | ICD-10-CM

## 2024-04-02 DIAGNOSIS — M41.80 DEXTROSCOLIOSIS: Chronic | ICD-10-CM

## 2024-04-02 DIAGNOSIS — J45.20 MILD INTERMITTENT ASTHMA WITHOUT COMPLICATION: Primary | ICD-10-CM

## 2024-04-02 DIAGNOSIS — G47.33 OBSTRUCTIVE SLEEP APNEA: Chronic | ICD-10-CM

## 2024-04-02 DIAGNOSIS — J44.9 COPD, MODERATE: Chronic | ICD-10-CM

## 2024-06-11 NOTE — PROGRESS NOTES
"Chief Complaint  Asthma    Subjective    History of Present Illness {CC  Problem List  Visit Diagnosis   Encounters  Notes  Medications  Labs  Result Review Imaging  Media: 23}    Feliberto Eli presents to [facility]ULMON[facility] CRIT[facility]ARE MEDICINE for:    History of Present Illness  Management of of his asthma/COPD overlap.  Breathing also restricted by severe dextroscoliosis.  Most recent FEV1 54. He has never smoked.  He is obese.       He has daily shortness of breath. His triggers are smoke, perfume and \"oil air fresheners\".  He has tried Spiriva, Trelegy and Breztri.  Neither of these were beneficial.  He prefers to have an albuterol inhaler he can use when needed.  He seldom requires it.  He does not need refills.     He has sleep apnea.  He has had in updated titration.  He did not reach REM sleep.  Ongoing treatment with auto titrating CPAP recommended.  He has been compliant with this.  So far he is benefiting without issue.      Evaluated for oxygen by Dr. Joseph.  He qualified for 2 L with exertion.  He lost insurance coverage and therefore lost his oxygen.  We discussed getting him requalified.  He wanted to get his insurance issues sorted out first.  He is still dealing with this issue.     He is followed by Dr. Lauren.  He has a history of aortic stenosis which contributes some to his shortness of breath.  He recently had a follow-up echo.  It was stable.       Prior to Admission medications    Medication Sig Start Date End Date Taking? Authorizing Provider   acetaminophen (TYLENOL) 500 MG tablet Take 2 tablets by mouth 2 (Two) Times a Day.    ProviderLamonte MD   albuterol sulfate  (90 Base) MCG/ACT inhaler Inhale 2 puffs Every 4 (Four) Hours As Needed for Wheezing.    ProviderLamonte MD   diltiaZEM CD (CARDIZEM CD) 240 MG 24 hr capsule  4/1/19   ProviderLamonte MD   diphenhydrAMINE (BENADRYL) 25 mg capsule Take 2 capsules by mouth 2 (two) times a " "day.    Lamonte Fox MD   Hydrocortisone, Perianal, (ANUSOL-HC) 2.5 % rectal cream  6/10/20   Lamonte Fox MD   lisinopril (PRINIVIL,ZESTRIL) 5 MG tablet  5/27/21   Lamonte Fox MD   naproxen (NAPROSYN) 500 MG tablet Take 1 tablet by mouth 2 (Two) Times a Day With Meals. 4/1/19   Lamonte Fox MD   pravastatin (PRAVACHOL) 40 MG tablet Take 1 tablet by mouth Daily. 4/1/19   Lamonte Fox MD       Social History     Socioeconomic History    Marital status:    Tobacco Use    Smoking status: Never    Smokeless tobacco: Never   Vaping Use    Vaping status: Never Used   Substance and Sexual Activity    Alcohol use: Never    Drug use: Never    Sexual activity: Not Currently     Partners: Female     Birth control/protection: Surgical, Abstinence       Objective   Vital Signs:   /72   Pulse 78   Ht 160 cm (63\")   Wt 83.9 kg (185 lb)   SpO2 93% Comment: RA  BMI 32.77 kg/m²     Physical Exam  Eyes:      Comments: glasses   Cardiovascular:      Rate and Rhythm: Normal rate and regular rhythm.      Heart sounds: Murmur heard.   Pulmonary:      Effort: Pulmonary effort is normal.      Breath sounds: Normal breath sounds.   Musculoskeletal:      Right lower leg: No edema.      Left lower leg: No edema.      Comments: Severe scoliosis   Neurological:      Mental Status: He is alert and oriented to person, place, and time.        Result Review :    PFT Values          4/6/2023    15:30 7/10/2024    09:00   Pre Drug PFT Results   FVC 63 62   FEV1 54 54   FEF 25-75% 37 39   FEV1/FVC 68 68   Other Tests PFT Results   DLCO 67    D/VAsb 99      Results for orders placed in visit on 07/10/24    Spirometry    Narrative  Spirometry    Performed by: Naomy Gleason, RRT  Authorized by: Gabriella Thomas APRN  Pre Drug % Predicted  FVC: 62%  FEV1: 54%  FEF 25-75%: 39%  FEV1/FVC: 68%    Interpretation  Spirometry  Spirometry shows moderate obstruction. There is reduced midflow " suggesting small airway/airflow obstruction.      Results for orders placed in visit on 23    Pulmonary Function Test    Narrative  Pulmonary Function Test  Performed by: Naomy Gleason, RRT  Authorized by: Aman Joseph MD    Pre Drug % Predicted  FVC: 63%  FEV1: 54%  FEF 25-75%: 37%  FEV1/FVC: 68%  DLCO: 67%  D/VAsb: 99%    Interpretation  Spirometry  Spirometry shows moderate obstruction. There is reduced midflow suggesting small airway/airflow obstruction.  Review of FVL curve  Patient's effort is normal.  Diffusion Capacity  The patient's diffusion capacity is mildly reduced.  Diffusion capacity is normal when corrected for alveolar volume.      Results for orders placed in visit on 21    Pulmonary Function Test    Narrative  Pulmonary Function Test  Performed by: Naomy Gleason RRT  Authorized by: Aman Joseph MD    Pre Drug % Predicted  FVC: 83%  FEV1: 67%  FEF 25-75%: 29%  FEV1/FVC: 64.17%  T%  RV: 94%  DLCO: 74%  D/VAsb: 102%    Interpretation  Spirometry  Spirometry shows moderate obstruction. There is reduced midflow suggesting small airway/airflow obstruction.  Review of FVL curve  Patient's effort is normal.  Lung Volume Measurements  Measurements show normal results.  Diffusion Capacity  The patient's diffusion capacity is normal.  Diffusion capacity is normal when corrected for alveolar volume.    My interpretation of imaging:  none    My interpretation of labs: none      Assessment and Plan {CC Problem List  Visit Diagnosis  ROS  Review (Popup)  [facility] Maintenance  Quality  BestPractice  Medications  SmartSets  SnapShot Encounters  Media : 23}    Diagnoses and all orders for this visit:    1. Mild persistent asthma without complication (Primary)  Comments:  PFT unchanged. Seldom requiring albuterol. Call if not helping or needing refills  Orders:  -     Spirometry    2. COPD, moderate  Comments:  PFT unchanged. Seldom requiring albuterol.  Call if not helping or needing refills    3. Dextroscoliosis  Comments:  Surgery not warranted    4. Obstructive sleep apnea  Comments:  Compliant with CPAP and benefitting. Please continue    5. Class 1 obesity due to excess calories with serious comorbidity and body mass index (BMI) of 31.0 to 31.9 in adult  Comments:  Contributes to shortness of breath. Educational material provided    6. Moderate aortic stenosis  Comments:  Contributes to shortness of breath. Defer to cardiology        Body mass index is 32.77 kg/m².    Gabriella Thomas, APRN  7/10/2024  08:51 CDT    Follow Up   Return in about 6 months (around 1/10/2025).    Patient was given instructions and counseling regarding his condition or for health maintenance advice. Please see specific information pulled into the AVS if appropriate.

## 2024-07-10 ENCOUNTER — OFFICE VISIT (OUTPATIENT)
Dept: PULMONOLOGY | Facility: CLINIC | Age: 69
End: 2024-07-10
Payer: MEDICARE

## 2024-07-10 VITALS
SYSTOLIC BLOOD PRESSURE: 122 MMHG | DIASTOLIC BLOOD PRESSURE: 72 MMHG | HEIGHT: 63 IN | WEIGHT: 185 LBS | HEART RATE: 78 BPM | BODY MASS INDEX: 32.78 KG/M2 | OXYGEN SATURATION: 93 %

## 2024-07-10 DIAGNOSIS — M41.80 DEXTROSCOLIOSIS: Chronic | ICD-10-CM

## 2024-07-10 DIAGNOSIS — J45.30 MILD PERSISTENT ASTHMA WITHOUT COMPLICATION: Primary | ICD-10-CM

## 2024-07-10 DIAGNOSIS — G47.33 OBSTRUCTIVE SLEEP APNEA: Chronic | ICD-10-CM

## 2024-07-10 DIAGNOSIS — J44.9 COPD, MODERATE: Chronic | ICD-10-CM

## 2024-07-10 DIAGNOSIS — E66.09 CLASS 1 OBESITY DUE TO EXCESS CALORIES WITH SERIOUS COMORBIDITY AND BODY MASS INDEX (BMI) OF 31.0 TO 31.9 IN ADULT: Chronic | ICD-10-CM

## 2024-07-10 DIAGNOSIS — I35.0 MODERATE AORTIC STENOSIS: Chronic | ICD-10-CM

## 2024-07-10 PROCEDURE — 1159F MED LIST DOCD IN RCRD: CPT | Performed by: NURSE PRACTITIONER

## 2024-07-10 PROCEDURE — 94375 RESPIRATORY FLOW VOLUME LOOP: CPT | Performed by: NURSE PRACTITIONER

## 2024-07-10 PROCEDURE — 1160F RVW MEDS BY RX/DR IN RCRD: CPT | Performed by: NURSE PRACTITIONER

## 2024-07-10 PROCEDURE — 99214 OFFICE O/P EST MOD 30 MIN: CPT | Performed by: NURSE PRACTITIONER

## 2024-07-10 NOTE — PROCEDURES
Spirometry    Performed by: Naomy Gleason, RRT  Authorized by: Gabriella Thomas APRN     Pre Drug % Predicted    FVC: 62%   FEV1: 54%   FEF 25-75%: 39%   FEV1/FVC: 68%    Interpretation   Spirometry   Spirometry shows moderate obstruction. There is reduced midflow suggesting small airway/airflow obstruction.

## 2025-01-29 ENCOUNTER — TELEPHONE (OUTPATIENT)
Dept: PULMONOLOGY | Facility: CLINIC | Age: 70
End: 2025-01-29

## 2025-02-27 NOTE — PROGRESS NOTES
"Chief Complaint  COPD    Subjective    History of Present Illness {  Problem List  Visit Diagnosis   Encounters  Notes  Medications  Labs  Result Review Imaging  Media: 23}    Feliberto Eli presents to [facility] PULMONARY & CRITICAL CARE MEDICINE for:    History of Present Illness  Management of of his asthma/COPD overlap.  Breathing also restricted by severe dextroscoliosis.  Most recent FEV1 54. He has never smoked.      He has daily shortness of breath. His triggers are smoke, perfume and \"oil air fresheners\".  He has tried Spiriva, Trelegy and Breztri.  Neither of these were beneficial.  He prefers to have an albuterol inhaler he can use when needed.  He seldom requires it. He needs a refill.      He has sleep apnea.  He has had in updated titration.  He did not reach REM sleep.  Ongoing treatment with auto titrating CPAP recommended.  He has been compliant with this and benefiting.       He has qualified for oxygen in the past but had insurance issues. He has new insurance. He would like to qualify for O2 again.      He is followed by Dr. Lauren.  He has a history of aortic stenosis which contributes some to his shortness of breath.  He recently had a follow-up echo.  It was stable.         Current Outpatient Medications:     acetaminophen (TYLENOL) 500 MG tablet, Take 2 tablets by mouth 2 (Two) Times a Day., Disp: , Rfl:     albuterol sulfate  (90 Base) MCG/ACT inhaler, Inhale 2 puffs Every 4 (Four) Hours As Needed for Wheezing or Shortness of Air for up to 30 days., Disp: 8 g, Rfl: 2    diltiaZEM CD (CARDIZEM CD) 240 MG 24 hr capsule, , Disp: , Rfl:     diphenhydrAMINE (BENADRYL) 25 mg capsule, Take 2 capsules by mouth 2 (two) times a day., Disp: , Rfl:     Hydrocortisone, Perianal, (ANUSOL-HC) 2.5 % rectal cream, , Disp: , Rfl:     lisinopril (PRINIVIL,ZESTRIL) 5 MG tablet, , Disp: , Rfl:     naproxen (NAPROSYN) 500 MG tablet, Take 1 tablet by mouth 2 (Two) Times a Day With " "Meals., Disp: , Rfl:     pravastatin (PRAVACHOL) 40 MG tablet, Take 1 tablet by mouth Daily., Disp: , Rfl:     Social History     Socioeconomic History    Marital status:    Tobacco Use    Smoking status: Never    Smokeless tobacco: Never   Vaping Use    Vaping status: Never Used   Substance and Sexual Activity    Alcohol use: Never    Drug use: Never    Sexual activity: Not Currently     Partners: Female     Birth control/protection: Surgical, Abstinence       Objective   Vital Signs:   /80   Pulse 86   Ht 165.1 cm (65\")   Wt 79.4 kg (175 lb)   SpO2 95%   BMI 29.12 kg/m²     Physical Exam  Eyes:      Comments: glasses   Cardiovascular:      Rate and Rhythm: Normal rate and regular rhythm.      Heart sounds: No murmur heard.  Pulmonary:      Effort: Pulmonary effort is normal.      Breath sounds: Normal breath sounds.   Musculoskeletal:      Right lower leg: No edema.      Left lower leg: No edema.      Comments: Severe scoliosis, arthritis   Neurological:      Mental Status: He is alert and oriented to person, place, and time.        Result Review :    PFT Values          4/6/2023    15:30 7/10/2024    09:00   Pre Drug PFT Results   FVC 63 62   FEV1 54 54   FEF 25-75% 37 39   FEV1/FVC 68 68   Other Tests PFT Results   DLCO 67    D/VAsb 99      My interpretation of the PFT : none    Results for orders placed in visit on 07/10/24    Spirometry    Narrative  Spirometry    Performed by: Naomy Gleason, RRT  Authorized by: Gabriella Thomas APRN  Pre Drug % Predicted  FVC: 62%  FEV1: 54%  FEF 25-75%: 39%  FEV1/FVC: 68%    Interpretation  Spirometry  Spirometry shows moderate obstruction. There is reduced midflow suggesting small airway/airflow obstruction.      Results for orders placed in visit on 04/06/23    Pulmonary Function Test    Narrative  Pulmonary Function Test  Performed by: Naomy Gleason, RRT  Authorized by: Aman Joseph MD    Pre Drug % Predicted  FVC: 63%  FEV1: " 54%  FEF 25-75%: 37%  FEV1/FVC: 68%  DLCO: 67%  D/VAsb: 99%    Interpretation  Spirometry  Spirometry shows moderate obstruction. There is reduced midflow suggesting small airway/airflow obstruction.  Review of FVL curve  Patient's effort is normal.  Diffusion Capacity  The patient's diffusion capacity is mildly reduced.  Diffusion capacity is normal when corrected for alveolar volume.      Results for orders placed in visit on 21    Pulmonary Function Test    Narrative  Pulmonary Function Test  Performed by: Naomy Gleason, RRT  Authorized by: Aman Joseph MD    Pre Drug % Predicted  FVC: 83%  FEV1: 67%  FEF 25-75%: 29%  FEV1/FVC: 64.17%  T%  RV: 94%  DLCO: 74%  D/VAsb: 102%    Interpretation  Spirometry  Spirometry shows moderate obstruction. There is reduced midflow suggesting small airway/airflow obstruction.  Review of FVL curve  Patient's effort is normal.  Lung Volume Measurements  Measurements show normal results.  Diffusion Capacity  The patient's diffusion capacity is normal.  Diffusion capacity is normal when corrected for alveolar volume.      My interpretation of imaging:  none    My interpretation of labs: none      Assessment and Plan {CC Problem List  Visit Diagnosis  ROS  Review (Popup)  Health Maintenance  Quality  BestPractice  Medications  SmartSets  SnapShot Encounters  Media : 23}    Diagnoses and all orders for this visit:    1. Mild intermittent asthma without complication (Primary)  Comments:  Seldom requires albuterol. Refills sent to pharmacy. PFTs with f/u  Orders:  -     albuterol sulfate  (90 Base) MCG/ACT inhaler; Inhale 2 puffs Every 4 (Four) Hours As Needed for Wheezing or Shortness of Air for up to 30 days.  Dispense: 8 g; Refill: 2    2. COPD, moderate  Comments:  Seldom requires albuterol. Refills sent to pharmacy. PFTs with f/u  Orders:  -     albuterol sulfate  (90 Base) MCG/ACT inhaler; Inhale 2 puffs Every 4 (Four) Hours As  Needed for Wheezing or Shortness of Air for up to 30 days.  Dispense: 8 g; Refill: 2    3. Obstructive sleep apnea  Comments:  Continue CPAP. He is compliant with this and benefiting from it    4. Dextroscoliosis  Comments:  Contributes to his shortness of breath    5. Moderate aortic stenosis  Comments:  Can contribute to shortness of breath. Keep f/u with cardiology this month to discuss repeat echo    6. Shortness of breath  Comments:  Will try to requalify him for oxygen  Orders:  -     Overnight Sleep Oximetry Study; Future        Body mass index is 29.12 kg/m².    Gabriella Thomas, APRN  3/11/2025  10:03 CDT    Follow Up   Return in about 6 months (around 9/11/2025) for FVL.    Patient was given instructions and counseling regarding his condition or for health maintenance advice. Please see specific information pulled into the AVS if appropriate.

## 2025-03-11 ENCOUNTER — OFFICE VISIT (OUTPATIENT)
Dept: PULMONOLOGY | Facility: CLINIC | Age: 70
End: 2025-03-11
Payer: MEDICARE

## 2025-03-11 VITALS
BODY MASS INDEX: 29.16 KG/M2 | HEART RATE: 86 BPM | WEIGHT: 175 LBS | DIASTOLIC BLOOD PRESSURE: 80 MMHG | OXYGEN SATURATION: 95 % | HEIGHT: 65 IN | SYSTOLIC BLOOD PRESSURE: 120 MMHG

## 2025-03-11 DIAGNOSIS — G47.33 OBSTRUCTIVE SLEEP APNEA: Chronic | ICD-10-CM

## 2025-03-11 DIAGNOSIS — I35.0 MODERATE AORTIC STENOSIS: Chronic | ICD-10-CM

## 2025-03-11 DIAGNOSIS — J44.9 COPD, MODERATE: Chronic | ICD-10-CM

## 2025-03-11 DIAGNOSIS — R06.02 SHORTNESS OF BREATH: ICD-10-CM

## 2025-03-11 DIAGNOSIS — J45.20 MILD INTERMITTENT ASTHMA WITHOUT COMPLICATION: Primary | ICD-10-CM

## 2025-03-11 DIAGNOSIS — M41.80 DEXTROSCOLIOSIS: Chronic | ICD-10-CM

## 2025-03-11 RX ORDER — ALBUTEROL SULFATE 90 UG/1
2 INHALANT RESPIRATORY (INHALATION) EVERY 4 HOURS PRN
Qty: 8 G | Refills: 2 | Status: SHIPPED | OUTPATIENT
Start: 2025-03-11 | End: 2025-04-10

## 2025-03-24 ENCOUNTER — RESULTS FOLLOW-UP (OUTPATIENT)
Dept: PULMONOLOGY | Facility: CLINIC | Age: 70
End: 2025-03-24

## 2025-05-05 ENCOUNTER — TELEPHONE (OUTPATIENT)
Dept: PULMONOLOGY | Facility: CLINIC | Age: 70
End: 2025-05-05

## 2025-05-05 NOTE — TELEPHONE ENCOUNTER
Patient had echo done and he said that he his aortic valve has gone from moderate to severe.  He has appointment with Dr. Lauren on June 5th and the decision will be made then if open heart or catheter will be done.    He just wanted to let you know this.

## 2025-05-07 ENCOUNTER — TRANSCRIBE ORDERS (OUTPATIENT)
Dept: ADMINISTRATIVE | Facility: HOSPITAL | Age: 70
End: 2025-05-07
Payer: MEDICARE

## 2025-05-07 DIAGNOSIS — R01.1 CARDIAC MURMUR: Primary | ICD-10-CM

## 2025-05-07 DIAGNOSIS — R53.83 OTHER FATIGUE: ICD-10-CM

## 2025-05-07 DIAGNOSIS — I35.0 NONRHEUMATIC AORTIC VALVE STENOSIS: ICD-10-CM

## 2025-06-18 ENCOUNTER — HOSPITAL ENCOUNTER (OUTPATIENT)
Dept: MRI IMAGING | Facility: HOSPITAL | Age: 70
Discharge: HOME | End: 2025-06-18
Admitting: NURSE PRACTITIONER
Payer: MEDICARE

## 2025-06-18 DIAGNOSIS — R53.83 OTHER FATIGUE: ICD-10-CM

## 2025-06-18 DIAGNOSIS — R01.1 CARDIAC MURMUR: ICD-10-CM

## 2025-06-18 DIAGNOSIS — I35.0 NONRHEUMATIC AORTIC VALVE STENOSIS: ICD-10-CM

## 2025-06-18 PROCEDURE — 25510000001 GADOPICLENOL 0.5 MMOL/ML SOLUTION: Performed by: NURSE PRACTITIONER

## 2025-06-18 PROCEDURE — 75561 CARDIAC MRI FOR MORPH W/DYE: CPT

## 2025-06-18 PROCEDURE — A9573 GADOPICLENOL 0.5 MMOL/ML SOLUTION: HCPCS | Performed by: NURSE PRACTITIONER

## 2025-06-18 RX ADMIN — GADOPICLENOL 7 ML: 485.1 INJECTION INTRAVENOUS at 09:51

## 2025-07-07 ENCOUNTER — TELEPHONE (OUTPATIENT)
Dept: CARDIOLOGY | Facility: CLINIC | Age: 70
End: 2025-07-07
Payer: MEDICARE

## 2025-07-07 NOTE — TELEPHONE ENCOUNTER
RECORDS RECEIVED FROM [facility] ON PATIENT NEEDING HEART CATH.  PLEASE REVIEW / ORDER

## 2025-07-21 DIAGNOSIS — I35.0 AORTIC STENOSIS, SEVERE: Primary | ICD-10-CM

## 2025-07-21 RX ORDER — SODIUM CHLORIDE 9 MG/ML
40 INJECTION, SOLUTION INTRAVENOUS AS NEEDED
OUTPATIENT
Start: 2025-07-21

## 2025-07-21 RX ORDER — SODIUM CHLORIDE 0.9 % (FLUSH) 0.9 %
3 SYRINGE (ML) INJECTION EVERY 12 HOURS SCHEDULED
OUTPATIENT
Start: 2025-07-21

## 2025-07-21 RX ORDER — SODIUM CHLORIDE 0.9 % (FLUSH) 0.9 %
10 SYRINGE (ML) INJECTION AS NEEDED
OUTPATIENT
Start: 2025-07-21

## (undated) DEVICE — GW STARTER FXD CORE J .035 3X150CM 3MM

## (undated) DEVICE — MODEL BT2000 P/N 700287-012KIT CONTENTS: MANIFOLD WITH SALINE AND CONTRAST PORTS, SALINE TUBING WITH SPIKE AND HAND SYRINGE, TRANSDUCER: Brand: BT2000 AUTOMATED MANIFOLD KIT

## (undated) DEVICE — PAD, DEFIB, ADULT, RADIOTRANS, PHILIPS: Brand: MEDLINE

## (undated) DEVICE — CANN NASL ETCO2 LO/FLO A/

## (undated) DEVICE — PK CATH CARD 30 CA/4

## (undated) DEVICE — PINNACLE INTRODUCER SHEATH: Brand: PINNACLE

## (undated) DEVICE — MODEL AT P65, P/N 701554-001KIT CONTENTS: HAND CONTROLLER, 3-WAY HIGH-PRESSURE STOPCOCK WITH ROTATING END AND PREMIUM HIGH-PRESSURE TUBING: Brand: ANGIOTOUCH® KIT

## (undated) DEVICE — SOLIDIFIER LIQUI LOC PLUS 2000CC

## (undated) DEVICE — SOL IRR NACL 0.9PCT BT 1000ML

## (undated) DEVICE — FR5 INFINITI MULTIPAC: Brand: INFINITI

## (undated) DEVICE — PERCLOSE™ PROSTYLE™ SUTURE-MEDIATED CLOSURE AND REPAIR SYSTEM: Brand: PERCLOSE™ PROSTYLE™